# Patient Record
Sex: FEMALE | Race: WHITE | NOT HISPANIC OR LATINO | Employment: FULL TIME | URBAN - METROPOLITAN AREA
[De-identification: names, ages, dates, MRNs, and addresses within clinical notes are randomized per-mention and may not be internally consistent; named-entity substitution may affect disease eponyms.]

---

## 2023-05-17 ENCOUNTER — TELEPHONE (OUTPATIENT)
Dept: PRIMARY CARE | Facility: CLINIC | Age: 53
End: 2023-05-17
Payer: COMMERCIAL

## 2023-05-17 DIAGNOSIS — I10 HYPERTENSION, UNSPECIFIED TYPE: ICD-10-CM

## 2023-05-17 NOTE — TELEPHONE ENCOUNTER
Refill:    Candesartan 8mg 1 tab daily    Pharm: [facility]     LR: 02/22/23 qty 30 no refills  LV: 10/10/22   NV: 06/23/23

## 2023-05-18 PROBLEM — M25.551 HIP PAIN, RIGHT: Status: ACTIVE | Noted: 2023-05-18

## 2023-05-18 PROBLEM — M77.11 LATERAL EPICONDYLITIS OF RIGHT ELBOW: Status: ACTIVE | Noted: 2023-05-18

## 2023-05-18 PROBLEM — G56.02 LEFT CARPAL TUNNEL SYNDROME: Status: ACTIVE | Noted: 2023-05-18

## 2023-05-18 PROBLEM — D50.9 IRON DEFICIENCY ANEMIA: Status: ACTIVE | Noted: 2023-05-18

## 2023-05-18 PROBLEM — R25.2 MUSCLE CRAMPS: Status: ACTIVE | Noted: 2023-05-18

## 2023-05-18 PROBLEM — K63.5 COLON POLYPS: Status: ACTIVE | Noted: 2023-05-18

## 2023-05-18 PROBLEM — N64.89 PSEUDOANGIOMATOUS STROMAL HYPERPLASIA OF BREAST: Status: ACTIVE | Noted: 2023-05-18

## 2023-05-18 PROBLEM — E55.9 VITAMIN D INSUFFICIENCY: Status: ACTIVE | Noted: 2023-05-18

## 2023-05-18 PROBLEM — R93.89 ABNORMAL MRI: Status: ACTIVE | Noted: 2023-05-18

## 2023-05-18 PROBLEM — Z85.3 PERSONAL HISTORY OF MALIGNANT NEOPLASM OF BREAST: Status: ACTIVE | Noted: 2023-05-18

## 2023-05-18 PROBLEM — R23.2 HOT FLASHES: Status: ACTIVE | Noted: 2023-05-18

## 2023-05-18 PROBLEM — I10 HYPERTENSION: Status: ACTIVE | Noted: 2023-05-18

## 2023-05-18 PROBLEM — M85.80 OSTEOPENIA: Status: ACTIVE | Noted: 2023-05-18

## 2023-05-18 PROBLEM — J30.9 ALLERGIC RHINITIS: Status: ACTIVE | Noted: 2023-05-18

## 2023-05-18 PROBLEM — R92.30 DENSE BREAST TISSUE: Status: ACTIVE | Noted: 2023-05-18

## 2023-05-18 RX ORDER — CANDESARTAN 8 MG/1
8 TABLET ORAL DAILY
Qty: 90 TABLET | Refills: 0 | Status: SHIPPED | OUTPATIENT
Start: 2023-05-18 | End: 2023-06-23 | Stop reason: SDUPTHER

## 2023-05-18 RX ORDER — CANDESARTAN 8 MG/1
8 TABLET ORAL DAILY
COMMUNITY
End: 2023-05-18 | Stop reason: SDUPTHER

## 2023-06-21 RX ORDER — TAMOXIFEN CITRATE 20 MG/1
20 TABLET ORAL DAILY
COMMUNITY
End: 2023-11-14 | Stop reason: HOSPADM

## 2023-06-21 RX ORDER — IBUPROFEN 200 MG
2 CAPSULE ORAL DAILY
COMMUNITY
Start: 2020-07-06

## 2023-06-22 ASSESSMENT — ENCOUNTER SYMPTOMS
PALPITATIONS: 0
PND: 0
HEADACHES: 0
NECK PAIN: 0
BLURRED VISION: 0
SWEATS: 0
HYPERTENSION: 1
ORTHOPNEA: 0
SHORTNESS OF BREATH: 0

## 2023-06-23 ENCOUNTER — OFFICE VISIT (OUTPATIENT)
Dept: PRIMARY CARE | Facility: CLINIC | Age: 53
End: 2023-06-23
Payer: COMMERCIAL

## 2023-06-23 VITALS
SYSTOLIC BLOOD PRESSURE: 126 MMHG | DIASTOLIC BLOOD PRESSURE: 72 MMHG | TEMPERATURE: 98.2 F | WEIGHT: 109 LBS | BODY MASS INDEX: 19.32 KG/M2

## 2023-06-23 DIAGNOSIS — R23.2 HOT FLASHES: Primary | ICD-10-CM

## 2023-06-23 DIAGNOSIS — I10 HYPERTENSION, UNSPECIFIED TYPE: ICD-10-CM

## 2023-06-23 LAB
POC FINGERSTICK BLOOD GLUCOSE: 90 MG/DL (ref 70–100)
POC HDL CHOLESTEROL: 67 MG/DL (ref 0–50)
POC LDL CHOLESTEROL: 43 MG/DL (ref 0–100)
POC NON-HDL CHOLESTEROL: 53 MG/DL (ref 0–130)
POC TOTAL CHOLESTEROL/HDL RATIO: 1.8 (ref 0–4.5)
POC TOTAL CHOLESTEROL: 120 MG/DL (ref 0–199)
POC TRIGLYCERIDES: 52 MG/DL (ref 0–150)

## 2023-06-23 PROCEDURE — 99214 OFFICE O/P EST MOD 30 MIN: CPT | Performed by: FAMILY MEDICINE

## 2023-06-23 PROCEDURE — 82962 GLUCOSE BLOOD TEST: CPT | Performed by: FAMILY MEDICINE

## 2023-06-23 PROCEDURE — 3074F SYST BP LT 130 MM HG: CPT | Performed by: FAMILY MEDICINE

## 2023-06-23 PROCEDURE — 1036F TOBACCO NON-USER: CPT | Performed by: FAMILY MEDICINE

## 2023-06-23 PROCEDURE — 3078F DIAST BP <80 MM HG: CPT | Performed by: FAMILY MEDICINE

## 2023-06-23 PROCEDURE — 80061 LIPID PANEL: CPT | Performed by: FAMILY MEDICINE

## 2023-06-23 RX ORDER — FEZOLINETANT 45 MG/1
45 TABLET, FILM COATED ORAL DAILY
Qty: 90 TABLET | Refills: 1 | Status: SHIPPED | OUTPATIENT
Start: 2023-06-23 | End: 2023-10-10 | Stop reason: ALTCHOICE

## 2023-06-23 RX ORDER — CANDESARTAN 8 MG/1
8 TABLET ORAL DAILY
Qty: 90 TABLET | Refills: 1 | Status: SHIPPED | OUTPATIENT
Start: 2023-06-23 | End: 2024-02-12 | Stop reason: SDUPTHER

## 2023-06-23 NOTE — PATIENT INSTRUCTIONS
You are doing a great job with your health.    Follow up in one month to be reevaluated on the Veozah for hot flashes.

## 2023-06-23 NOTE — PROGRESS NOTES
Subjective   Patient ID: [id number]     Cristine Mac is a 52 y.o. female who presents for Med Management.    HPI  Still bothered by frequent daily and multiple nocturnal temperature flashes.   only mildly improved with SleepEase.    Review of Systems  GEN-no unexplained fever or chills  OPTH-No dry eyes, itchy eyes, or blurry vision   ENT-No hearing loss, tinnitus or vertigo  NECK-no stiffness, swelling or pain  PSYCH-No complaints regarding libido, appetite, memory or concentration;  no drug use or alcohol usage over six per week  SLEEP-No complaints of insomnia, apnea or restless legs  ALL/IMMUN-No history of sneezing or itching  HEM-No unexplained bruising or bleeding    Objective     /72 (BP Location: Right arm, Patient Position: Sitting)   Temp 36.8 °C (98.2 °F) (Oral)   Wt 49.4 kg (109 lb)   BMI 19.32 kg/m²      Physical Exam  Eyes-pupils equal round, reactive to light and accommodation, fundi with normal cup/disc ratio, conjunctiva without redness or discharge  General- well defined, well nourished, well hydrated individual in NAD  Skin- normal color and turgor; without nail pitting  Head-normocephalic without masses or tenderness  Ears-normal pinnae, and canals, with normal landmarks and light reflex of tympanic membranes bilaterally  Nose-septum in the midline, normal mucosa bilaterally  Throat- without erythema or exudate, uvula in midlineNeck-supple without lymphadenopathy or thyromegaly; no carotid bruits  Heart- regular rate and rhythm, normal s1 and s2 without murmur or gallop  Lungs-clear to auscultation  Abdomen-soft, positive bowel sounds, without masses, HSmegaly or pain   Assessment/Plan     Problem List Items Addressed This Visit       Hot flashes - Primary    Relevant Medications    fezolinetant (Veozah) 45 mg tablet    Hypertension    Relevant Medications    candesartan (Atacand) 8 mg tablet    Other Relevant Orders    POCT fingerstick glucose manually resulted    POCT Lipid Panel  manually resulted     You are doing a great job with your health.    Follow up in one month to be reevaluated on the Veozah for hot flashes.    Raman Rivas MD

## 2023-07-06 ENCOUNTER — TELEPHONE (OUTPATIENT)
Dept: PRIMARY CARE | Facility: CLINIC | Age: 53
End: 2023-07-06
Payer: COMMERCIAL

## 2023-07-06 NOTE — TELEPHONE ENCOUNTER
Patient calling stating that she didn't fill the fezolinetant (Veozah) 45 mg tablet. It cost to much at the pharmacy to get it. Patient wanted you to beware of this.  Thank you.

## 2023-07-24 ENCOUNTER — APPOINTMENT (OUTPATIENT)
Dept: PRIMARY CARE | Facility: CLINIC | Age: 53
End: 2023-07-24
Payer: COMMERCIAL

## 2023-10-09 ASSESSMENT — ENCOUNTER SYMPTOMS
VOMITING: 0
SORE THROAT: 0
FLANK PAIN: 0
ANOREXIA: 0
CHILLS: 0
HEMATURIA: 0
DYSURIA: 0
HEADACHES: 0
NAUSEA: 0
BACK PAIN: 0
ABDOMINAL PAIN: 0
CONSTIPATION: 0
DIARRHEA: 0
FREQUENCY: 0
FEVER: 0

## 2023-10-10 ENCOUNTER — OFFICE VISIT (OUTPATIENT)
Dept: PRIMARY CARE | Facility: CLINIC | Age: 53
End: 2023-10-10
Payer: COMMERCIAL

## 2023-10-10 VITALS — SYSTOLIC BLOOD PRESSURE: 116 MMHG | DIASTOLIC BLOOD PRESSURE: 70 MMHG | TEMPERATURE: 98.1 F

## 2023-10-10 DIAGNOSIS — H81.13 BENIGN PAROXYSMAL POSITIONAL VERTIGO DUE TO BILATERAL VESTIBULAR DISORDER: Primary | ICD-10-CM

## 2023-10-10 DIAGNOSIS — R42 VERTIGO: ICD-10-CM

## 2023-10-10 DIAGNOSIS — C50.412 MALIGNANT NEOPLASM OF UPPER-OUTER QUADRANT OF LEFT FEMALE BREAST, UNSPECIFIED ESTROGEN RECEPTOR STATUS (MULTI): ICD-10-CM

## 2023-10-10 PROCEDURE — 3078F DIAST BP <80 MM HG: CPT | Performed by: FAMILY MEDICINE

## 2023-10-10 PROCEDURE — 1036F TOBACCO NON-USER: CPT | Performed by: FAMILY MEDICINE

## 2023-10-10 PROCEDURE — 99213 OFFICE O/P EST LOW 20 MIN: CPT | Performed by: FAMILY MEDICINE

## 2023-10-10 PROCEDURE — 3074F SYST BP LT 130 MM HG: CPT | Performed by: FAMILY MEDICINE

## 2023-10-10 RX ORDER — MECLIZINE HYDROCHLORIDE 25 MG/1
25 TABLET ORAL 3 TIMES DAILY PRN
Qty: 30 TABLET | Refills: 11 | Status: SHIPPED | OUTPATIENT
Start: 2023-10-10 | End: 2024-10-09

## 2023-10-10 NOTE — PATIENT INSTRUCTIONS
Your Benign Positional Vertigo can be alleviated by reproducing the movement that triggered the vertigo and keeping your eyes fixed on a point on the horizon or inside, choose a wall switch.  Then you need to repeat the maneuver five times, each time waiting until the issue resolves.  Call if your symptoms persist over one week.

## 2023-10-10 NOTE — PROGRESS NOTES
Subjective   Patient ID: [id number]     Cristine Mac is a 53 y.o. female who presents for Vertigo.    HPI  Cristine woke up today without her glasses and when she rolled over , she had vertigo;  symptoms improved until h she got her hair done  Review of Systems  General-denies weakness or myalgias; no unexplained fever or chills  Opth-needs glasses to see over 12 inches  ENT-No hearing loss, tinnitus or sneezing  Neck-no unexplained swelling in neck or pain  ID-no fever  NEURO- No headaches, hx of concussion, falls in the last year or seizure       Objective     /70 (BP Location: Left arm, Patient Position: Sitting)   Temp 36.7 °C (98.1 °F) (Oral)      Physical Exam  General- well defined, well nourished, well hydrated individual in NAD  Eyes-pupils equal round, reactive to light and accommodation, fundi with normal cup/disc ratio, conjunctiva without redness or dischargeHead-normocephalic without masses or tenderness; extinguishable nystagmus noted with supine rotation  Ears-normal pinnae, and canals, with normal landmarks and light reflex of tympanic membranes bilaterally  Nose-septum in the midline, normal mucosa bilaterally  Throat- without erythema or exudate, uvula in midline  Neck-supple without lymphadenopathy or thyromegaly; no carotid bruits  Neuro- alert and & oriented times three;  CN's II through XII grossly intact; DTR's 2+ and symmetrical;  Negative Romberg;  muscle strength is 5/5 bilateral upper and lower extremities      Assessment/Plan     Problem List Items Addressed This Visit       Malignant neoplasm of upper-outer quadrant of left female breast, unspecified estrogen receptor status (CMS/HCC)    Vertigo - Primary    Benign paroxysmal positional vertigo due to bilateral vestibular disorder     Your Benign Positional Vertigo can be alleviated by reproducing the movement that triggered the vertigo and keeping your eyes fixed on a point on the horizon or inside, choose a wall switch.  Then you  need to repeat the maneuver five times, each time waiting until the issue resolves.  Call if your symptoms persist over one week.    Raman Rivas MD

## 2023-10-12 ENCOUNTER — OFFICE VISIT (OUTPATIENT)
Dept: OBSTETRICS AND GYNECOLOGY | Facility: CLINIC | Age: 53
End: 2023-10-12
Payer: COMMERCIAL

## 2023-10-12 VITALS
SYSTOLIC BLOOD PRESSURE: 112 MMHG | DIASTOLIC BLOOD PRESSURE: 80 MMHG | BODY MASS INDEX: 19.78 KG/M2 | WEIGHT: 107.5 LBS | HEIGHT: 62 IN

## 2023-10-12 DIAGNOSIS — N95.0 PMB (POSTMENOPAUSAL BLEEDING): Primary | ICD-10-CM

## 2023-10-12 DIAGNOSIS — Z79.810 USE OF TAMOXIFEN (NOLVADEX): ICD-10-CM

## 2023-10-12 LAB — PREGNANCY TEST URINE, POC: NEGATIVE

## 2023-10-12 PROCEDURE — 3079F DIAST BP 80-89 MM HG: CPT | Performed by: STUDENT IN AN ORGANIZED HEALTH CARE EDUCATION/TRAINING PROGRAM

## 2023-10-12 PROCEDURE — 58100 BIOPSY OF UTERUS LINING: CPT | Performed by: STUDENT IN AN ORGANIZED HEALTH CARE EDUCATION/TRAINING PROGRAM

## 2023-10-12 PROCEDURE — 88305 TISSUE EXAM BY PATHOLOGIST: CPT

## 2023-10-12 PROCEDURE — 1036F TOBACCO NON-USER: CPT | Performed by: STUDENT IN AN ORGANIZED HEALTH CARE EDUCATION/TRAINING PROGRAM

## 2023-10-12 PROCEDURE — 81025 URINE PREGNANCY TEST: CPT | Performed by: STUDENT IN AN ORGANIZED HEALTH CARE EDUCATION/TRAINING PROGRAM

## 2023-10-12 PROCEDURE — 3074F SYST BP LT 130 MM HG: CPT | Performed by: STUDENT IN AN ORGANIZED HEALTH CARE EDUCATION/TRAINING PROGRAM

## 2023-10-12 PROCEDURE — 99213 OFFICE O/P EST LOW 20 MIN: CPT | Performed by: STUDENT IN AN ORGANIZED HEALTH CARE EDUCATION/TRAINING PROGRAM

## 2023-10-12 NOTE — PROGRESS NOTES
Subjective   Cristine Mac is a 53 y.o. presenting for PMB. Not sure when she officially went through menopause but states that is has been >1 year since her last period. Reports rare spotting last year and now with daily spotting and dark discharge requiring panty liner use. Sexually active with  who has had a vasectomy. Some vaginal dryness, no dyspareunia or postcoital bleeding. Denies h/o STI or concern for STI today. Hot flashes previously managed with venlafaxine and gabapentin, no longer taking. Is able to sleep through the night. On tamoxifen for ER/OK+ breast cancer. Has previously had endometrial cells on pap smears with two negative EMBs, most recently in . Of note, recently diagnosed with vertigo, symptoms improving but still present.    Pap History  -2019: NIL/HRHPV neg  -2016: NIL, +endometrial cells -> EMB proliferative  -2015: NIL, +endometrial cells -> EMB disordered proliferative endometrium with tubal metaplasia    OBHx:  x2  PMHx: ER/OK positive invasive lobular carcinoma of left breast s/p lumpectomy in 2018 now on tamoxifen, HTN, osteopenia, basal cell carcinoma  SurgHx: denies uterine procedures  Meds/Allergies: updated  FHx: daughter  of kidney cancer at age 12, maternal grandmother with ovarian cancer in her 50s, mother with breast cancer in her 70s, patient s/p negative genetic testing    Objective   Physical Exam  Vitals:    10/12/23 0836   BP: 112/80      Gen: awake, alert  Head: NCAT  HEENT: moist mucus membranes  Pulm: breathing comfortably on room air  CV: warm and well-perfused  : anteverted uterus, no palpable mass. Evidence of vaginal atrophy on exam. No blood in vault. Cervix normal in appearance and required dilation. Sounded to 7cm. See separate procedure note for more detail  Neuro: alert and oriented  Psych: appropriate affect     Assessment/Plan     Cristine Mac is a 53 y.o. postmenopausal woman who presents today for PMB on tamoxifen.      Postmenopausal Bleeding  -Reviewed possible etiologies including atrophy, polyps, endometrial hyperplasia, and endometrial carcinoma. Additionally reviewed possible non-uterine sources of bleeding including vulva, vagina, and cervix  -Exam today with small anteverted uterus and evidence of vaginal atrophy, no active bleeding  -Discussed r/b/a to EMB vs TVUS in initial workup. Given current tamoxifen use, recommend EMB. Performed today, adequate sample, tolerated well  -Pap UTD, low risk for STI and declined testing today  -If EMB negative, did discuss possible diagnostic hysteroscopy for further evaluation if bleeding persists     Will call with EMB results     Anai Shea MD

## 2023-10-12 NOTE — PROGRESS NOTES
Endometrial biopsy    Date/Time: 10/12/2023 9:08 AM    Performed by: Anai Shea MD  Authorized by: Anai Shea MD    Consent:     Consent obtained: written    Consent given by: patient    Risks discussed: bleeding, damage to other organs, infection, need for repeat procedure and pain    Patient agrees, verbalizes understanding, and wants to proceed: yes      Procedure explained and questions answered to patient or proxy's satisfaction: yes    Indications:     Indications: postmenopausal bleeding    Pre-procedure:     Urine pregnancy test: negative    Procedure:     A bimanual exam was performed: yes      Uterus size: 6-8 weeks    Uterus position: anteverted    Prepped with: Betadine    Tenaculum used: yes      A local block was performed: no      Cervix dilated: yes      Number of passes: 1  Findings:     Cervix: stenotic      Uterus depth by sound (cm): 7    Specimen collected: specimen collected and sent to pathology      Patient tolerance: tolerated well, no immediate complications     Anai Shea MD

## 2023-10-23 LAB
LABORATORY COMMENT REPORT: NORMAL
PATH REPORT.FINAL DX SPEC: NORMAL
PATH REPORT.GROSS SPEC: NORMAL
PATH REPORT.RELEVANT HX SPEC: NORMAL
PATH REPORT.TOTAL CANCER: NORMAL

## 2023-10-26 ENCOUNTER — TELEPHONE (OUTPATIENT)
Dept: OBSTETRICS AND GYNECOLOGY | Facility: CLINIC | Age: 53
End: 2023-10-26
Payer: COMMERCIAL

## 2023-10-26 ENCOUNTER — HOSPITAL ENCOUNTER (OUTPATIENT)
Dept: RADIOLOGY | Facility: HOSPITAL | Age: 53
Discharge: HOME | End: 2023-10-26
Payer: COMMERCIAL

## 2023-10-26 DIAGNOSIS — N95.0 PMB (POSTMENOPAUSAL BLEEDING): ICD-10-CM

## 2023-10-26 PROCEDURE — 76830 TRANSVAGINAL US NON-OB: CPT | Performed by: RADIOLOGY

## 2023-10-26 PROCEDURE — 76856 US EXAM PELVIC COMPLETE: CPT

## 2023-10-26 PROCEDURE — 76857 US EXAM PELVIC LIMITED: CPT | Performed by: RADIOLOGY

## 2023-10-26 NOTE — TELEPHONE ENCOUNTER
Patient notified through [employer] to schedule ultrasound. Advised to call our office after she has this done to schedule an appointment to discuss results.

## 2023-11-02 ENCOUNTER — TELEPHONE (OUTPATIENT)
Dept: HEMATOLOGY/ONCOLOGY | Facility: CLINIC | Age: 53
End: 2023-11-02

## 2023-11-02 ENCOUNTER — PREP FOR PROCEDURE (OUTPATIENT)
Dept: OBSTETRICS AND GYNECOLOGY | Facility: CLINIC | Age: 53
End: 2023-11-02

## 2023-11-02 ENCOUNTER — TELEMEDICINE (OUTPATIENT)
Dept: OBSTETRICS AND GYNECOLOGY | Facility: CLINIC | Age: 53
End: 2023-11-02
Payer: COMMERCIAL

## 2023-11-02 DIAGNOSIS — N95.0 POSTMENOPAUSAL BLEEDING: Primary | ICD-10-CM

## 2023-11-02 PROCEDURE — 99213 OFFICE O/P EST LOW 20 MIN: CPT | Performed by: STUDENT IN AN ORGANIZED HEALTH CARE EDUCATION/TRAINING PROGRAM

## 2023-11-02 NOTE — PROGRESS NOTES
Subjective   Patient ID [id number]   Cristine Mac is a 53 y.o. with PMB presenting for virtual follow-up. Patient with ER/CA positive breast cancer s/p lumpectomy in 2018 now on tamoxifen and having daily spotting. EMB with scant superficial inactive endometrium as well as fragment of necrotic/hyalinized endometrium, possible infarcted polyp. TVUS with 18mm heterogenous, hypervascular endometrium with cystic changes. Since last visit, patient reports continued daily spotting. She is now seeing ALONSO Avril Rivera as Dr. Dodson is retiring.    Assessment/Plan     Cristine Mac is a 53 y.o. with PMB on tamoxifen presenting for virtual follow-up    PMB  -EMB with scant superficial inactive endometrium as well as fragment of necrotic/hyalinized endometrium, possible infarcted polyp  -TVUS with EMS measuring 18mm, hypervascular with cystic changes  -Discussed concern for endometrial hyperplasia/malignancy based on ultrasound findings and recommendation for diagnostic hysteroscopy with possible polypectomy and directed endometrial sampling. Reviewed risks of surgery including but not limited to bleeding, infection, uterine perforation, and damage to surrounding structures. Patient expressed understanding and desires to proceed. Will plan for PAT and same day discharge.   -ALONSO Miguel updated on patient status and recommends holding tamoxifen until tissue diagnosis is obtained     To schedule surgery    Anai Shea MD

## 2023-11-02 NOTE — TELEPHONE ENCOUNTER
Received this email:     Chico Serna,     I am a gynecologist taking care of Cristine Mac (MRN [id number]). She is a 52 y/o patient of Dr. Dodson with ER/WI positive breast cancer s/p lumpectomy in 2018 who has been on tamoxifen for the past 6 years and now has postmenopausal bleeding with imaging concerning for endometrial hyperplasia/malignancy. I am planning to take her to the OR this month for a hysteroscopy with biopsy to obtain a tissue diagnosis and just wanted to loop you in. I am not sure if you have seen her as a patient yet but she said she was transitioning to care with you now that Dr. Dodson is retiring. Will keep you posted on the outcome.    Thanks,    Anai Shea MD    [facility]  [address] [place] [address], [place] [address]  [phone]        Called patient and left her a VM to call me to confirm she received my message to HOLD tamoxifen while above situation is being worked up/evaluated. Left my office #. Updated Dr. Shea.

## 2023-11-03 ENCOUNTER — TELEPHONE (OUTPATIENT)
Dept: HEMATOLOGY/ONCOLOGY | Facility: HOSPITAL | Age: 53
End: 2023-11-03
Payer: COMMERCIAL

## 2023-11-03 NOTE — TELEPHONE ENCOUNTER
Patient called me back and agreed to hold tamoxifen until work up is done by gyn and we know biopsy results.

## 2023-11-09 ENCOUNTER — PRE-ADMISSION TESTING (OUTPATIENT)
Dept: PREADMISSION TESTING | Facility: HOSPITAL | Age: 53
End: 2023-11-09
Payer: COMMERCIAL

## 2023-11-09 ENCOUNTER — LAB (OUTPATIENT)
Dept: LAB | Facility: LAB | Age: 53
End: 2023-11-09
Payer: COMMERCIAL

## 2023-11-09 VITALS
RESPIRATION RATE: 16 BRPM | WEIGHT: 106.7 LBS | OXYGEN SATURATION: 100 % | DIASTOLIC BLOOD PRESSURE: 80 MMHG | SYSTOLIC BLOOD PRESSURE: 129 MMHG | TEMPERATURE: 98.8 F | HEART RATE: 62 BPM | BODY MASS INDEX: 19.64 KG/M2 | HEIGHT: 62 IN

## 2023-11-09 DIAGNOSIS — N95.0 POSTMENOPAUSAL BLEEDING: ICD-10-CM

## 2023-11-09 DIAGNOSIS — Z01.818 PREOP EXAMINATION: ICD-10-CM

## 2023-11-09 DIAGNOSIS — Z01.818 PREOP EXAMINATION: Primary | ICD-10-CM

## 2023-11-09 DIAGNOSIS — N93.8 DUB (DYSFUNCTIONAL UTERINE BLEEDING): ICD-10-CM

## 2023-11-09 LAB
ABO GROUP (TYPE) IN BLOOD: NORMAL
ANION GAP SERPL CALC-SCNC: 13 MMOL/L (ref 10–20)
ANTIBODY SCREEN: NORMAL
BUN SERPL-MCNC: 11 MG/DL (ref 6–23)
CALCIUM SERPL-MCNC: 9.4 MG/DL (ref 8.6–10.3)
CHLORIDE SERPL-SCNC: 105 MMOL/L (ref 98–107)
CO2 SERPL-SCNC: 27 MMOL/L (ref 21–32)
CREAT SERPL-MCNC: 0.88 MG/DL (ref 0.5–1.05)
ERYTHROCYTE [DISTWIDTH] IN BLOOD BY AUTOMATED COUNT: 12.4 % (ref 11.5–14.5)
GFR SERPL CREATININE-BSD FRML MDRD: 78 ML/MIN/1.73M*2
GLUCOSE SERPL-MCNC: 75 MG/DL (ref 74–99)
HCT VFR BLD AUTO: 34.7 % (ref 36–46)
HGB BLD-MCNC: 11.6 G/DL (ref 12–16)
MCH RBC QN AUTO: 29.9 PG (ref 26–34)
MCHC RBC AUTO-ENTMCNC: 33.4 G/DL (ref 32–36)
MCV RBC AUTO: 89 FL (ref 80–100)
NRBC BLD-RTO: 0 /100 WBCS (ref 0–0)
PLATELET # BLD AUTO: 270 X10*3/UL (ref 150–450)
POTASSIUM SERPL-SCNC: 3.9 MMOL/L (ref 3.5–5.3)
RBC # BLD AUTO: 3.88 X10*6/UL (ref 4–5.2)
RH FACTOR (ANTIGEN D): NORMAL
SODIUM SERPL-SCNC: 141 MMOL/L (ref 136–145)
WBC # BLD AUTO: 3.5 X10*3/UL (ref 4.4–11.3)

## 2023-11-09 PROCEDURE — 99202 OFFICE O/P NEW SF 15 MIN: CPT | Performed by: NURSE PRACTITIONER

## 2023-11-09 PROCEDURE — 86900 BLOOD TYPING SEROLOGIC ABO: CPT

## 2023-11-09 PROCEDURE — 85027 COMPLETE CBC AUTOMATED: CPT

## 2023-11-09 PROCEDURE — 93010 ELECTROCARDIOGRAM REPORT: CPT | Performed by: INTERNAL MEDICINE

## 2023-11-09 PROCEDURE — 86850 RBC ANTIBODY SCREEN: CPT

## 2023-11-09 PROCEDURE — 80048 BASIC METABOLIC PNL TOTAL CA: CPT

## 2023-11-09 PROCEDURE — 36415 COLL VENOUS BLD VENIPUNCTURE: CPT

## 2023-11-09 PROCEDURE — 93005 ELECTROCARDIOGRAM TRACING: CPT

## 2023-11-09 PROCEDURE — 86901 BLOOD TYPING SEROLOGIC RH(D): CPT

## 2023-11-09 ASSESSMENT — CHADS2 SCORE
CHF: NO
DIABETES: NO
AGE GREATER THAN OR EQUAL TO 75: NO
CHADS2 SCORE: 1
PRIOR STROKE OR TIA OR THROMBOEMBOLISM: NO
HYPERTENSION: YES

## 2023-11-09 ASSESSMENT — DUKE ACTIVITY SCORE INDEX (DASI)
DASI METS SCORE: 9.9
CAN YOU WALK INDOORS, SUCH AS AROUND YOUR HOUSE: YES
TOTAL_SCORE: 58.2
CAN YOU WALK A BLOCK OR TWO ON LEVEL GROUND: YES
CAN YOU DO HEAVY WORK AROUND THE HOUSE LIKE SCRUBBING FLOORS OR LIFTING AND MOVING HEAVY FURNITURE: YES
CAN YOU DO LIGHT WORK AROUND THE HOUSE LIKE DUSTING OR WASHING DISHES: YES
CAN YOU RUN A SHORT DISTANCE: YES
CAN YOU DO YARD WORK LIKE RAKING LEAVES, WEEDING OR PUSHING A MOWER: YES
CAN YOU PARTICIPATE IN STRENOUS SPORTS LIKE SWIMMING, SINGLES TENNIS, FOOTBALL, BASKETBALL, OR SKIING: YES
CAN YOU HAVE SEXUAL RELATIONS: YES
CAN YOU CLIMB A FLIGHT OF STAIRS OR WALK UP A HILL: YES
CAN YOU TAKE CARE OF YOURSELF (EAT, DRESS, BATHE, OR USE TOILET): YES
CAN YOU DO MODERATE WORK AROUND THE HOUSE LIKE VACUUMING, SWEEPING FLOORS OR CARRYING GROCERIES: YES
CAN YOU PARTICIPATE IN MODERATE RECREATIONAL ACTIVITIES LIKE GOLF, BOWLING, DANCING, DOUBLES TENNIS OR THROWING A BASEBALL OR FOOTBALL: YES

## 2023-11-09 ASSESSMENT — LIFESTYLE VARIABLES: SMOKING_STATUS: NONSMOKER

## 2023-11-09 ASSESSMENT — PAIN - FUNCTIONAL ASSESSMENT: PAIN_FUNCTIONAL_ASSESSMENT: 0-10

## 2023-11-09 ASSESSMENT — ACTIVITIES OF DAILY LIVING (ADL): ADL_SCORE: 0

## 2023-11-09 NOTE — PREPROCEDURE INSTRUCTIONS
Medication List            Accurate as of November 9, 2023  9:16 AM. Always use your most recent med list.                calcium carbonate-vitamin D3 500 mg-3.125 mcg (125 unit) tablet tablet  Medication Adjustments for Surgery: Stop 7 days before surgery     candesartan 8 mg tablet  Commonly known as: Atacand  Take 1 tablet (8 mg) by mouth once daily.  Medication Adjustments for Surgery: Take morning of surgery with sip of water, no other fluids     meclizine 25 mg tablet  Commonly known as: Antivert  Take 1 tablet (25 mg) by mouth 3 times a day as needed for dizziness.  Medication Adjustments for Surgery: Other (Comment)  Notes to patient: Prn- hold day of procedure     tamoxifen 20 mg tablet  Commonly known as: Nolvadex  Medication Adjustments for Surgery: Other (Comment)  Notes to patient: Not taking                        PRE-OPERATIVE INSTRUCTIONS    You will receive notification one business day prior to your surgery to confirm your arrival time and additional information. It is important that you answer your phone and/or check your messages during this time.    Please enter the building through the Outpatient entrance. Take the elevator off the lobby to the 2nd floor and check in at the Outpatient Surgery desk    INSTRUCTIONS:  Talk to your surgeon for instructions if you should stop your aspirin, blood thinner, or diabetes medicines.  DO NOT take any multivitamins or over the counter supplements for 7-10 days before surgery.  If not being admitted, you must have an adult immediately available to drive you home after surgery. We also highly recommend you have someone stay with you for the entire day and night of your surgery.  For children having surgery, a parent or legal guardian must accompany t hem to the surgery center. If this is not possible, please call [phone] to make additional arrangements.  For adults who are unable to consent or make medical decisions for themselves, a legal guardian or  Power of  must accompany them to the surgery center. If this is not possible, please call [phone] to make additional arrangements.  Wear comfortable, loose fitting clothing.  All jewelry and piercings must be removed. If you are unable to remove an item or have a dermal piercing, please be sure to tell the nurse when you arrive for surgery.  Nail polish and make-up must be removed.  Avoid smoking or consuming alcohol for 24 hours before surgery.  To help prevent infection, please take a shower/bath and wash your hair the night before and/or morning of surgery.    Additional instructions about eating and drinking before surgery:  Do not eat any solid foods or drink anything but clear liquids within 6 hours of your arrival time for surgery. Milk, nutritional drinks/supplements, and infant formula are considered solid foods.  You may drink up to 12 oz. of clear liquids up to 2 hours before your arrival time for surgery, unless directed otherwise by your surgeon. Clear liquids include water, non-carbonated sports drinks (Gatorade), black tea or coffee (no creamers) and breast milk.    If you received a blue folder, please review additional information provided inside the folder regarding additional preparation.     If you have any questions or concerns, please call Pre-Admission Testing at [phone].          Preoperative Bathing instructions    Follow these instructions the evening before and morning of surgery:  Do not shave the day before or day of surgery.  Remove all jewelry until after surgery. Take off rings and take out all body-piercing jewelry.  Use a clean wash cloth and towel.  Wash your face and hair with your normal soap and shampoo before you use the CHG soap.  Use a wash cloth to clean your skin with the CHG soap. Use enough CHG soap to cover the skin on your whole body. Use the same amount as you would with your normal soap.  Do not use the CHG soap on your face, eyes, ears, or  head.  Do not scrub your skin too hard.  Be sure to clean the area well where surgery will be done.  Do not wash with your normal soap after the CHG soap.  Keep away from the water stream when you put CHG soap on. This keeps it from rinsing off too soon.  Rinse your body well.  Pat yourself dry with a clean, soft towel.  Put on clean clothing.  Do not put on any deodorants, lotions, or oils after showering. These might block how the CHG soap works.

## 2023-11-09 NOTE — CPM/PAT H&P
"CPM/PAT Evaluation       Name: Patsy JOSE Mac (Patsy JOSE Mac \"Cristine\")  /Age: 1970/53 y.o.     In-Person     HPI53 yo female here for preop evaluation for DUB for the past 2 months. Spotting but no clotting. Transvaginal US showed thickened endometrial lining. No abdominal pain or cramping.    Past Medical History:   Diagnosis Date    Achilles tendinitis, left leg 2017    Achilles tendinitis of left lower extremity    Anaphylactic shock, unspecified, initial encounter 2015    Exercise induced anaphylaxis    Anemia     Cancer (CMS/[facility])     Chalazion left eye, unspecified eyelid 2015    Acute chalazion of left eye    Cough, unspecified 2014    Cough    Decreased white blood cell count, unspecified 2018    Decreased white blood cell count    Essential (primary) hypertension 10/10/2022    Hypertension    Follicular cyst of the skin and subcutaneous tissue, unspecified 2019    Skin cyst    Hordeolum externum left eye, unspecified eyelid 2016    Hordeolum externum of left eye    Hordeolum externum left eye, unspecified eyelid 2015    Hordeolum externum of left eye    Hordeolum externum unspecified eye, unspecified eyelid 2015    Stye    Hordeolum externum unspecified eye, unspecified eyelid 2015    Stye    Hordeolum externum unspecified eye, unspecified eyelid 2019    Stye    Lesion of sciatic nerve, right lower limb 2021    Piriformis syndrome of right side    Melena 2015    Hematochezia    Other conditions influencing health status 2020    Colonoscopy planned    Other conditions influencing health status 2018    Seroma, postoperative    Other general symptoms and signs 10/18/2020    Flu-like symptoms    Other specified disorders of eye and adnexa 2015    Eye irritation    Pain in left ankle and joints of left foot     Left ankle pain    Pain in left wrist 2017    Left wrist pain    Pain in leg, unspecified " 01/28/2019    Lower extremity pain    Pain in right finger(s) 01/21/2019    Pain of right thumb    Pain in unspecified shoulder 01/28/2019    Shoulder pain    Peroneal tendinitis, left leg 07/13/2020    Peroneal tendinitis of left lower extremity    Personal history of other diseases of the digestive system 09/01/2015    History of acute gastritis    Personal history of other diseases of the musculoskeletal system and connective tissue 05/10/2019    History of sacroiliac joint dysfunction    Personal history of other diseases of the musculoskeletal system and connective tissue 05/10/2019    History of low back pain    Personal history of other diseases of the respiratory system 01/06/2014    History of sinusitis    Personal history of other diseases of the respiratory system 07/01/2014    History of pharyngitis    Personal history of other specified conditions 03/11/2015    History of atypical nevus    Sciatica, right side 05/10/2019    Sciatica of right side    Stress fracture, left foot, initial encounter for fracture 07/06/2020    Metatarsal stress fracture of left foot    Vertigo Oct 2023    resolved    Vitamin D deficiency        Past Surgical History:   Procedure Laterality Date    BREAST BIOPSY      BREAST LUMPECTOMY      BREAST SURGERY  Feb 2018    KNEE SURGERY  04/09/2015    Knee Surgery    LYMPH NODE BIOPSY  Feb 2018    NOSE SURGERY  04/09/2015    Nose Surgery    OTHER SURGICAL HISTORY  04/07/2015    Prior Surgical Procedure Not Done    OTHER SURGICAL HISTORY  04/09/2015    Wrist Primary Excision Of Ganglion    SINUS SURGERY      WISDOM TOOTH EXTRACTION         Patient  reports being sexually active and has had partner(s) who are male. She reports using the following method of birth control/protection: Male Sterilization.    Family History   Problem Relation Name Age of Onset    Breast cancer Mother Pat     Cancer Mother Pat     Hypertension Mother Pat     Macular degeneration Father Rich     Vision loss  Father Rich     Wilm's tumor Daughter Gaby     Cancer Daughter Gaby     Breast cancer Maternal Grandmother Risa     Cancer Maternal Grandmother Risa     Other (malignant Neoplasm of breast) Paternal Grandmother      Cancer Other Gaby     Intellectual Disability Brother Jamshid        No Known Allergies    Prior to Admission medications    Medication Sig Start Date End Date Taking? Authorizing Provider   calcium carbonate-vitamin D3 500 mg-3.125 mcg (125 unit) tablet tablet Take 2 tablets by mouth once daily. 7/6/20   Historical Provider, MD   candesartan (Atacand) 8 mg tablet Take 1 tablet (8 mg) by mouth once daily. 6/23/23 12/20/23  Raman Rivas MD   meclizine (Antivert) 25 mg tablet Take 1 tablet (25 mg) by mouth 3 times a day as needed for dizziness. 10/10/23 10/9/24  Raman Rivas MD   tamoxifen (Nolvadex) 20 mg tablet Take 1 tablet (20 mg total) by mouth once daily.    Historical Provider, MD      Constitutional: Negative for fever, chills, or sweats   ENMT: Negative for nasal discharge, congestion, ear pain, mouth pain, throat pain   Respiratory: Negative for cough, wheezing, shortness of breath   Cardiac: Negative for chest pain, dyspnea on exertion, palpitations   Gastrointestinal: Negative for nausea, vomiting, diarrhea, constipation, abdominal pain  Genitourinary: see hpi  Musculoskeletal: Negative for decreased ROM, pain, swelling, weakness   Neurological: Negative for dizziness, confusion, headache  Psychiatric: Negative for mood changes   Skin: Negative for itching, rash, ulcer    Hematologic/Lymph: Negative for bruising, easy bleeding  Allergic/Immunologic: Negative itching, sneezing, swelling     CONSTITUTIONAL - thin, well developed, looks like stated age  SKIN - normal skin color and pigmentation, no rash or lesions  HEAD - no trauma, normocephalic  EYES - contact lenses, pupils are equal and reactive to light, extraocular muscles are intact  ENT - uvula midline, normal tongue movement  and throat normal, no exudate, nasal passage without discharge and patent  NECK - supple without rigidity, full ROM  CHEST - clear to auscultation, no wheezing, no crackles and no rales, good effort  CARDIAC - regular rate and regular rhythm, no skipped beats, no murmur  ABDOMEN - no organomegaly, soft, nontender, nondistended, normal bowel sounds, no guarding/rebound/rigidity   EXTREMITIES - no edema, no deformities  NEUROLOGICAL - normal gait, normal balance, normal motor; alert, oriented and no focal signs  PSYCHIATRIC - alert, pleasant and cordial, age-appropriate  IMMUNOLOGIC - no cervical lymphadenopathy     PAT AIRWAY:   Airway:     Mallampati::  II  normal        Visit Vitals  /80   Pulse 62   Temp 37.1 °C (98.8 °F) (Temporal)   Resp 16     EKG: NSR HR 65 bpm    DASI Risk Score    No data to display       Caprini DVT Assessment    No data to display       Modified Frailty Index    No data to display       CHADS2 Stroke Risk  Current as of 13 minutes ago        N/A 3 - 100%: High Risk   2 - 3%: Medium Risk   0 - 2%: Low Risk     Last Change: N/A          This score determines the patient's risk of having a stroke if the patient has atrial fibrillation.        This score is not applicable to this patient. Components are not calculated.          Revised Cardiac Risk Index    No data to display       Apfel Simplified Score    No data to display       Risk Analysis Index Results This Encounter    No data found in the last 1 encounters.         Assessment and Plan:   52 yo female scheduled for hysteroscopy with excision tissue with Dr. Shea 11/14/2023. Labs ordered by surgeon. BMP/EKG ordered.    See risk scores as previously documented.

## 2023-11-11 LAB
ATRIAL RATE: 65 BPM
P AXIS: 71 DEGREES
P OFFSET: 206 MS
P ONSET: 150 MS
PR INTERVAL: 136 MS
Q ONSET: 218 MS
QRS COUNT: 11 BEATS
QRS DURATION: 98 MS
QT INTERVAL: 412 MS
QTC CALCULATION(BAZETT): 428 MS
QTC FREDERICIA: 422 MS
R AXIS: 82 DEGREES
T AXIS: 58 DEGREES
T OFFSET: 424 MS
VENTRICULAR RATE: 65 BPM

## 2023-11-13 ENCOUNTER — PREP FOR PROCEDURE (OUTPATIENT)
Dept: OBSTETRICS AND GYNECOLOGY | Facility: HOSPITAL | Age: 53
End: 2023-11-13
Payer: COMMERCIAL

## 2023-11-14 ENCOUNTER — HOSPITAL ENCOUNTER (OUTPATIENT)
Facility: HOSPITAL | Age: 53
Setting detail: OUTPATIENT SURGERY
Discharge: HOME | End: 2023-11-14
Attending: STUDENT IN AN ORGANIZED HEALTH CARE EDUCATION/TRAINING PROGRAM | Admitting: STUDENT IN AN ORGANIZED HEALTH CARE EDUCATION/TRAINING PROGRAM
Payer: COMMERCIAL

## 2023-11-14 ENCOUNTER — ANESTHESIA (OUTPATIENT)
Dept: OPERATING ROOM | Facility: HOSPITAL | Age: 53
End: 2023-11-14
Payer: COMMERCIAL

## 2023-11-14 ENCOUNTER — ANESTHESIA EVENT (OUTPATIENT)
Dept: OPERATING ROOM | Facility: HOSPITAL | Age: 53
End: 2023-11-14
Payer: COMMERCIAL

## 2023-11-14 VITALS
SYSTOLIC BLOOD PRESSURE: 136 MMHG | TEMPERATURE: 98.8 F | HEART RATE: 62 BPM | DIASTOLIC BLOOD PRESSURE: 79 MMHG | OXYGEN SATURATION: 100 % | RESPIRATION RATE: 16 BRPM

## 2023-11-14 DIAGNOSIS — G89.18 POSTOPERATIVE PAIN: ICD-10-CM

## 2023-11-14 DIAGNOSIS — N95.0 POSTMENOPAUSAL BLEEDING: Primary | ICD-10-CM

## 2023-11-14 DIAGNOSIS — R52 POSTPARTUM PAIN (HHS-HCC): ICD-10-CM

## 2023-11-14 PROCEDURE — 3600000003 HC OR TIME - INITIAL BASE CHARGE - PROCEDURE LEVEL THREE: Performed by: STUDENT IN AN ORGANIZED HEALTH CARE EDUCATION/TRAINING PROGRAM

## 2023-11-14 PROCEDURE — A58558 PR HYSTEROSCOPY,W/ENDO BX: Performed by: ANESTHESIOLOGIST ASSISTANT

## 2023-11-14 PROCEDURE — A58558 PR HYSTEROSCOPY,W/ENDO BX: Performed by: ANESTHESIOLOGY

## 2023-11-14 PROCEDURE — 58558 HYSTEROSCOPY BIOPSY: CPT | Performed by: STUDENT IN AN ORGANIZED HEALTH CARE EDUCATION/TRAINING PROGRAM

## 2023-11-14 PROCEDURE — 88305 TISSUE EXAM BY PATHOLOGIST: CPT | Mod: TC,SUR,STJLAB | Performed by: STUDENT IN AN ORGANIZED HEALTH CARE EDUCATION/TRAINING PROGRAM

## 2023-11-14 PROCEDURE — 7100000009 HC PHASE TWO TIME - INITIAL BASE CHARGE: Performed by: STUDENT IN AN ORGANIZED HEALTH CARE EDUCATION/TRAINING PROGRAM

## 2023-11-14 PROCEDURE — 3700000001 HC GENERAL ANESTHESIA TIME - INITIAL BASE CHARGE: Performed by: STUDENT IN AN ORGANIZED HEALTH CARE EDUCATION/TRAINING PROGRAM

## 2023-11-14 PROCEDURE — 7100000010 HC PHASE TWO TIME - EACH INCREMENTAL 1 MINUTE: Performed by: STUDENT IN AN ORGANIZED HEALTH CARE EDUCATION/TRAINING PROGRAM

## 2023-11-14 PROCEDURE — 3600000008 HC OR TIME - EACH INCREMENTAL 1 MINUTE - PROCEDURE LEVEL THREE: Performed by: STUDENT IN AN ORGANIZED HEALTH CARE EDUCATION/TRAINING PROGRAM

## 2023-11-14 PROCEDURE — 2500000004 HC RX 250 GENERAL PHARMACY W/ HCPCS (ALT 636 FOR OP/ED): Performed by: ANESTHESIOLOGIST ASSISTANT

## 2023-11-14 PROCEDURE — 3700000002 HC GENERAL ANESTHESIA TIME - EACH INCREMENTAL 1 MINUTE: Performed by: STUDENT IN AN ORGANIZED HEALTH CARE EDUCATION/TRAINING PROGRAM

## 2023-11-14 PROCEDURE — 88305 TISSUE EXAM BY PATHOLOGIST: CPT | Mod: TC,STJLAB | Performed by: STUDENT IN AN ORGANIZED HEALTH CARE EDUCATION/TRAINING PROGRAM

## 2023-11-14 PROCEDURE — 7100000002 HC RECOVERY ROOM TIME - EACH INCREMENTAL 1 MINUTE: Performed by: STUDENT IN AN ORGANIZED HEALTH CARE EDUCATION/TRAINING PROGRAM

## 2023-11-14 PROCEDURE — 2720000007 HC OR 272 NO HCPCS: Performed by: STUDENT IN AN ORGANIZED HEALTH CARE EDUCATION/TRAINING PROGRAM

## 2023-11-14 PROCEDURE — 7100000001 HC RECOVERY ROOM TIME - INITIAL BASE CHARGE: Performed by: STUDENT IN AN ORGANIZED HEALTH CARE EDUCATION/TRAINING PROGRAM

## 2023-11-14 PROCEDURE — 88305 TISSUE EXAM BY PATHOLOGIST: CPT | Performed by: PATHOLOGY

## 2023-11-14 PROCEDURE — 2500000005 HC RX 250 GENERAL PHARMACY W/O HCPCS: Performed by: ANESTHESIOLOGIST ASSISTANT

## 2023-11-14 RX ORDER — IBUPROFEN 600 MG/1
600 TABLET ORAL EVERY 6 HOURS PRN
Qty: 120 TABLET | Refills: 1 | Status: SHIPPED | OUTPATIENT
Start: 2023-11-14 | End: 2023-11-28 | Stop reason: ALTCHOICE

## 2023-11-14 RX ORDER — DIPHENHYDRAMINE HYDROCHLORIDE 50 MG/ML
12.5 INJECTION INTRAMUSCULAR; INTRAVENOUS ONCE AS NEEDED
Status: DISCONTINUED | OUTPATIENT
Start: 2023-11-14 | End: 2023-11-14 | Stop reason: HOSPADM

## 2023-11-14 RX ORDER — ACETAMINOPHEN 325 MG/1
975 TABLET ORAL ONCE
Status: DISCONTINUED | OUTPATIENT
Start: 2023-11-14 | End: 2023-11-14 | Stop reason: HOSPADM

## 2023-11-14 RX ORDER — DEXAMETHASONE SODIUM PHOSPHATE 4 MG/ML
INJECTION, SOLUTION INTRA-ARTICULAR; INTRALESIONAL; INTRAMUSCULAR; INTRAVENOUS; SOFT TISSUE AS NEEDED
Status: DISCONTINUED | OUTPATIENT
Start: 2023-11-14 | End: 2023-11-14

## 2023-11-14 RX ORDER — PHENYLEPHRINE HCL IN 0.9% NACL 1 MG/10 ML
SYRINGE (ML) INTRAVENOUS AS NEEDED
Status: DISCONTINUED | OUTPATIENT
Start: 2023-11-14 | End: 2023-11-14

## 2023-11-14 RX ORDER — NORETHINDRONE AND ETHINYL ESTRADIOL 0.5-0.035
KIT ORAL AS NEEDED
Status: DISCONTINUED | OUTPATIENT
Start: 2023-11-14 | End: 2023-11-14

## 2023-11-14 RX ORDER — HYDROMORPHONE HYDROCHLORIDE 1 MG/ML
0.2 INJECTION, SOLUTION INTRAMUSCULAR; INTRAVENOUS; SUBCUTANEOUS EVERY 5 MIN PRN
Status: DISCONTINUED | OUTPATIENT
Start: 2023-11-14 | End: 2023-11-14 | Stop reason: HOSPADM

## 2023-11-14 RX ORDER — ONDANSETRON HYDROCHLORIDE 2 MG/ML
INJECTION, SOLUTION INTRAVENOUS AS NEEDED
Status: DISCONTINUED | OUTPATIENT
Start: 2023-11-14 | End: 2023-11-14

## 2023-11-14 RX ORDER — ONDANSETRON HYDROCHLORIDE 2 MG/ML
4 INJECTION, SOLUTION INTRAVENOUS ONCE AS NEEDED
Status: DISCONTINUED | OUTPATIENT
Start: 2023-11-14 | End: 2023-11-14 | Stop reason: HOSPADM

## 2023-11-14 RX ORDER — LABETALOL HYDROCHLORIDE 5 MG/ML
5 INJECTION, SOLUTION INTRAVENOUS ONCE AS NEEDED
Status: DISCONTINUED | OUTPATIENT
Start: 2023-11-14 | End: 2023-11-14 | Stop reason: HOSPADM

## 2023-11-14 RX ORDER — ALBUTEROL SULFATE 0.83 MG/ML
2.5 SOLUTION RESPIRATORY (INHALATION) ONCE AS NEEDED
Status: DISCONTINUED | OUTPATIENT
Start: 2023-11-14 | End: 2023-11-14 | Stop reason: HOSPADM

## 2023-11-14 RX ORDER — HYDRALAZINE HYDROCHLORIDE 20 MG/ML
5 INJECTION INTRAMUSCULAR; INTRAVENOUS EVERY 30 MIN PRN
Status: DISCONTINUED | OUTPATIENT
Start: 2023-11-14 | End: 2023-11-14 | Stop reason: HOSPADM

## 2023-11-14 RX ORDER — LIDOCAINE HYDROCHLORIDE 20 MG/ML
INJECTION, SOLUTION EPIDURAL; INFILTRATION; INTRACAUDAL; PERINEURAL AS NEEDED
Status: DISCONTINUED | OUTPATIENT
Start: 2023-11-14 | End: 2023-11-14

## 2023-11-14 RX ORDER — SODIUM CHLORIDE, SODIUM LACTATE, POTASSIUM CHLORIDE, CALCIUM CHLORIDE 600; 310; 30; 20 MG/100ML; MG/100ML; MG/100ML; MG/100ML
100 INJECTION, SOLUTION INTRAVENOUS CONTINUOUS
Status: DISCONTINUED | OUTPATIENT
Start: 2023-11-14 | End: 2023-11-14 | Stop reason: HOSPADM

## 2023-11-14 RX ORDER — LIDOCAINE HYDROCHLORIDE 10 MG/ML
0.1 INJECTION, SOLUTION EPIDURAL; INFILTRATION; INTRACAUDAL; PERINEURAL ONCE
Status: DISCONTINUED | OUTPATIENT
Start: 2023-11-14 | End: 2023-11-14 | Stop reason: HOSPADM

## 2023-11-14 RX ORDER — FENTANYL CITRATE 50 UG/ML
INJECTION, SOLUTION INTRAMUSCULAR; INTRAVENOUS AS NEEDED
Status: DISCONTINUED | OUTPATIENT
Start: 2023-11-14 | End: 2023-11-14

## 2023-11-14 RX ORDER — OXYCODONE HYDROCHLORIDE 5 MG/1
5 TABLET ORAL EVERY 4 HOURS PRN
Status: DISCONTINUED | OUTPATIENT
Start: 2023-11-14 | End: 2023-11-14 | Stop reason: HOSPADM

## 2023-11-14 RX ORDER — HYDROMORPHONE HYDROCHLORIDE 1 MG/ML
0.5 INJECTION, SOLUTION INTRAMUSCULAR; INTRAVENOUS; SUBCUTANEOUS EVERY 5 MIN PRN
Status: DISCONTINUED | OUTPATIENT
Start: 2023-11-14 | End: 2023-11-14 | Stop reason: HOSPADM

## 2023-11-14 RX ORDER — GLYCOPYRROLATE 0.2 MG/ML
INJECTION INTRAMUSCULAR; INTRAVENOUS AS NEEDED
Status: DISCONTINUED | OUTPATIENT
Start: 2023-11-14 | End: 2023-11-14

## 2023-11-14 RX ORDER — PROPOFOL 10 MG/ML
INJECTION, EMULSION INTRAVENOUS AS NEEDED
Status: DISCONTINUED | OUTPATIENT
Start: 2023-11-14 | End: 2023-11-14

## 2023-11-14 RX ORDER — ACETAMINOPHEN 500 MG
1000 TABLET ORAL EVERY 6 HOURS PRN
COMMUNITY
Start: 2023-11-14 | End: 2023-11-28 | Stop reason: ALTCHOICE

## 2023-11-14 RX ORDER — MIDAZOLAM HYDROCHLORIDE 1 MG/ML
INJECTION, SOLUTION INTRAMUSCULAR; INTRAVENOUS AS NEEDED
Status: DISCONTINUED | OUTPATIENT
Start: 2023-11-14 | End: 2023-11-14

## 2023-11-14 RX ORDER — OXYCODONE AND ACETAMINOPHEN 5; 325 MG/1; MG/1
1 TABLET ORAL EVERY 4 HOURS PRN
Status: DISCONTINUED | OUTPATIENT
Start: 2023-11-14 | End: 2023-11-14 | Stop reason: HOSPADM

## 2023-11-14 RX ADMIN — FENTANYL CITRATE 50 MCG: 50 INJECTION, SOLUTION INTRAMUSCULAR; INTRAVENOUS at 12:47

## 2023-11-14 RX ADMIN — Medication 50 MCG: at 13:01

## 2023-11-14 RX ADMIN — LIDOCAINE HYDROCHLORIDE 50 MG: 20 INJECTION, SOLUTION EPIDURAL; INFILTRATION; INTRACAUDAL; PERINEURAL at 12:47

## 2023-11-14 RX ADMIN — GLYCOPYRROLATE 0.2 MG: 0.2 INJECTION, SOLUTION INTRAMUSCULAR; INTRAVENOUS at 13:02

## 2023-11-14 RX ADMIN — MIDAZOLAM 2 MG: 1 INJECTION INTRAMUSCULAR; INTRAVENOUS at 12:40

## 2023-11-14 RX ADMIN — PROPOFOL 160 MG: 10 INJECTION, EMULSION INTRAVENOUS at 12:47

## 2023-11-14 RX ADMIN — EPHEDRINE SULFATE 10 MG: 50 INJECTION, SOLUTION INTRAVENOUS at 13:06

## 2023-11-14 RX ADMIN — SODIUM CHLORIDE, SODIUM LACTATE, POTASSIUM CHLORIDE, AND CALCIUM CHLORIDE: 600; 310; 30; 20 INJECTION, SOLUTION INTRAVENOUS at 12:36

## 2023-11-14 RX ADMIN — ONDANSETRON 4 MG: 2 INJECTION INTRAMUSCULAR; INTRAVENOUS at 12:54

## 2023-11-14 RX ADMIN — DEXAMETHASONE SODIUM PHOSPHATE 4 MG: 4 INJECTION, SOLUTION INTRAMUSCULAR; INTRAVENOUS at 12:54

## 2023-11-14 RX ADMIN — PROPOFOL 40 MG: 10 INJECTION, EMULSION INTRAVENOUS at 12:48

## 2023-11-14 SDOH — HEALTH STABILITY: MENTAL HEALTH: CURRENT SMOKER: 0

## 2023-11-14 ASSESSMENT — PAIN SCALES - GENERAL
PAINLEVEL_OUTOF10: 0 - NO PAIN

## 2023-11-14 ASSESSMENT — PAIN - FUNCTIONAL ASSESSMENT
PAIN_FUNCTIONAL_ASSESSMENT: 0-10

## 2023-11-14 ASSESSMENT — COLUMBIA-SUICIDE SEVERITY RATING SCALE - C-SSRS
1. IN THE PAST MONTH, HAVE YOU WISHED YOU WERE DEAD OR WISHED YOU COULD GO TO SLEEP AND NOT WAKE UP?: NO
2. HAVE YOU ACTUALLY HAD ANY THOUGHTS OF KILLING YOURSELF?: NO
6. HAVE YOU EVER DONE ANYTHING, STARTED TO DO ANYTHING, OR PREPARED TO DO ANYTHING TO END YOUR LIFE?: NO

## 2023-11-14 NOTE — OP NOTE
Date: 2023  OR Location: [facility] OR    Name: Patsy Mac, : 1970, Age: 53 y.o., MRN: [id number], Sex: female    Diagnosis  Pre-op Diagnosis     * Postmenopausal bleeding [N95.0] Post-op Diagnosis     * Postmenopausal bleeding [N95.0]     Procedures  Hysteroscopy with Excision Tissue  00759 - LA HYSTEROSCOPY BX ENDOMETRIUM&/POLYPC W/WO D&C      Surgeons      * Anai Shea - Primary    Resident/Fellow/Other Assistant:  Surgeon(s) and Role:    Procedure Summary  Anesthesia: General  ASA: II  Anesthesia Staff: Anesthesiologist: Jamshid Shukla MD  C-AA: YONATAN Aguilera  Estimated Blood Loss: 5mL  Intra-op Medications: * No intraprocedure medications in log *           Anesthesia Record               Intraprocedure I/O Totals          Intake    .00 mL    Propofol Drip 0.00 mL    The total shown is the total volume documented since Anesthesia Start was filed.    Total Intake 800 mL       Output    Est. Blood Loss 5 mL    Total Output 5 mL       Net    Net Volume 795 mL          Specimen:   ID Type Source Tests Collected by Time   1 : ENDOMETRIAL CURATAGE Tissue ENDOMETRIUM CURETTINGS SURGICAL PATHOLOGY EXAM Anai Shea MD 2023 1316   2 : ENDOMETRIAL POLYP Tissue ENDOMETRIUM POLYPECTOMY - HYSTEROSCOPIC SURGICAL PATHOLOGY EXAM Anai Shea MD 2023 1316        Staff:   Circulator: Laure Valenzuela, RN; Glenis Herron RN  Scrub Person: Nathaly Fernandez    Indication: 52 y/o with ER/LA+ breast cancer on tamoxifen presenting for diagnostic hysteroscopy, possible polypectomy, endometrial sampling for postmenopausal bleeding    Findings: uterus sounded to 8cm. Large endometrial polyp occupying entire uterine cavity. Bilateral ostia unable to be visualized.     Procedure: The patient was taken to the operating room where anesthesia was administered. She was then positioned in the dorsal lithotomy position and a bimanual exam was performed. She was prepped and draped in the normal  sterile fashion. A metal speculum was placed in the vagina. The anterior lip of the cervix was then grasped with a single-tooth tenaculum. The cervix was dilated to 15 Turkmen using Renner dilators. A 4.6mm (Coral) Aveta hysteroscope was then inserted through the cervical canal and the interior of the uterus was examined. A large endometrial polyp was found to be taking up the entirety of the uterine cavity. The ostia were not able to be visualized around the polyp. The Flex resection device was inserted and the polyp was resected. The hysteroscope was then removed (fluid deficit 1150cc though the majority of this fluid leaked around the drape onto the floor). A 1 x 0.5 cm fragment of polyp was expelled with removal of the hysteroscope. Sharp curettage was then performed on all four walls of the uterus until a satisfactory uterine cry was felt. The single tooth tenaculum was removed and the cervix was found to be hemostatic. The speculum was removed. The patient tolerated the procedure well. Anesthesia was reversed without difficulty. All counts were correct.     Anai Shea MD

## 2023-11-14 NOTE — ANESTHESIA PROCEDURE NOTES
Airway  Date/Time: 11/14/2023 12:48 PM  Urgency: elective    Airway not difficult    Staffing  Performed: CAA   Authorized by: Jamshid Shukla MD    Performed by: YONATAN Aguilera  Patient location during procedure: OR    Indications and Patient Condition  Indications for airway management: anesthesia  Spontaneous Ventilation: absent  Sedation level: deep  Preoxygenated: yes  MILS not maintained throughout  Mask difficulty assessment: 1 - vent by mask    Final Airway Details  Final airway type: supraglottic airway      Successful airway: unique  Size 3     Number of attempts at approach: 1

## 2023-11-14 NOTE — ANESTHESIA POSTPROCEDURE EVALUATION
Patient: Patsy Mac    Procedure Summary       Date: 11/14/23 Room / Location: [facility] OR 05 / Virtual [facility] OR    Anesthesia Start: 1239 Anesthesia Stop:     Procedure: Hysteroscopy with Excision Tissue (Vagina ) Diagnosis:       Postmenopausal bleeding      (Postmenopausal bleeding [N95.0])    Surgeons: Anai Shea MD Responsible Provider: Jamshid Shukla MD    Anesthesia Type: general ASA Status: 2            Anesthesia Type: general    Vitals Value Taken Time   /96 11/14/23 1330   Temp 36.2 °C (97.2 °F) 11/14/23 1330   Pulse 98 11/14/23 1330   Resp 14 11/14/23 1330   SpO2 100 % 11/14/23 1330   Vitals shown include unvalidated device data.    Anesthesia Post Evaluation    Patient location during evaluation: PACU  Patient participation: complete - patient participated  Level of consciousness: awake and alert  Pain management: satisfactory to patient  Multimodal analgesia pain management approach  Airway patency: patent  Cardiovascular status: acceptable, blood pressure returned to baseline and hemodynamically stable  Respiratory status: acceptable, room air, nonlabored ventilation and spontaneous ventilation  Hydration status: acceptable  Postoperative Nausea and Vomiting: none        No notable events documented.

## 2023-11-14 NOTE — ANESTHESIA PREPROCEDURE EVALUATION
Patient: Patsy Mac    Procedure Information       Date/Time: 11/14/23 1230    Procedure: Hysteroscopy with Excision Tissue - Aveta    Location: [facility] OR 05 / [facility] OR    Surgeons: Anai Shea MD            Relevant Problems   No relevant active problems       Clinical information reviewed:   Tobacco  Allergies  Meds   Med Hx  Surg Hx   Fam Hx  Soc Hx        NPO Detail:  No data recorded     Physical Exam    Airway  Mallampati: II  TM distance: >3 FB     Cardiovascular   Rhythm: regular  Rate: normal     Dental - normal exam     Pulmonary - normal exam     Abdominal            Anesthesia Plan    ASA 2     general     The patient is not a current smoker.    intravenous induction   Anesthetic plan and risks discussed with patient.    Plan discussed with CAA.

## 2023-11-14 NOTE — H&P
Subjective   Patient ID [id number]   Patsy Mac is a 53 y.o. with ER/GA+ breast cancer on tamoxifen presenting for diagnostic hysteroscopy, possible polypectomy, and endometrial sampling for postmenopausal bleeding. She is s/p office EMB and TVUS as below. Her breast provider is holding her tamoxifen pending workup of PMB.    Pre-Op Labs (23): hgb 11.6, Cr 0.88  TVUS (10/23/23): uterus measures 8.7 x 4.2 x 5.3 cm. Endometrium measures 1.8cm. Heterogenous and hypervascular with cystic changes  EMB (10/12/23):  Fragment of necrotic/hyalinized endometrial tissue with reactive epithelium, cannot exclude origin from an infarcted endometrial polyp. Scant superficial strips of inactive endometrium.    Pap History  -2019: NIL/HRHPV neg  -2016: NIL, +endometrial cells -> EMB proliferative  -2015: NIL, +endometrial cells -> EMB disordered proliferative endometrium with tubal metaplasia     OBHx:  x2  PMHx: ER/GA positive invasive lobular carcinoma of left breast s/p lumpectomy in 2018 now on tamoxifen, HTN, osteopenia, basal cell carcinoma  SurgHx: breast surgery, denies abdominal surgeries  Meds/Allergies: updated  FHx: daughter  of kidney cancer at age 12, maternal grandmother with ovarian cancer in her 50s, mother with breast cancer in her 70s, patient s/p negative genetic testing    Objective   Physical Exam  Vitals:    23 1200   BP: (!) 182/91   Pulse: 51   Resp: 16   Temp: 37 °C (98.6 °F)   SpO2: 100%        Gen: awake, alert  Head: NCAT  HEENT: moist mucus membranes  Pulm: breathing comfortably on room air  CV: warm and well-perfused  MSK: moving all four extremities  Neuro: alert and oriented  Psych: appropriate affect     Assessment/Plan     Patsy Mac is a 53 y.o. with ER/GA+ breast cancer on tamoxifen presenting for diagnostic hysteroscopy, possible polypectomy, and endometrial sampling for postmenopausal bleeding.   -Pre-op hgb 11.6, T&S  -Pre-op abx: not indicated  -Dispo:  anticipate same day discharge    Anai Shea MD

## 2023-11-28 ENCOUNTER — OFFICE VISIT (OUTPATIENT)
Dept: OBSTETRICS AND GYNECOLOGY | Facility: CLINIC | Age: 53
End: 2023-11-28
Payer: COMMERCIAL

## 2023-11-28 ENCOUNTER — TELEPHONE (OUTPATIENT)
Dept: HEMATOLOGY/ONCOLOGY | Facility: CLINIC | Age: 53
End: 2023-11-28

## 2023-11-28 VITALS
BODY MASS INDEX: 19.96 KG/M2 | WEIGHT: 109.13 LBS | DIASTOLIC BLOOD PRESSURE: 82 MMHG | SYSTOLIC BLOOD PRESSURE: 120 MMHG

## 2023-11-28 DIAGNOSIS — N95.0 POSTMENOPAUSAL BLEEDING: Primary | ICD-10-CM

## 2023-11-28 DIAGNOSIS — N84.0 ENDOMETRIAL POLYP: ICD-10-CM

## 2023-11-28 PROCEDURE — 3079F DIAST BP 80-89 MM HG: CPT | Performed by: STUDENT IN AN ORGANIZED HEALTH CARE EDUCATION/TRAINING PROGRAM

## 2023-11-28 PROCEDURE — 3074F SYST BP LT 130 MM HG: CPT | Performed by: STUDENT IN AN ORGANIZED HEALTH CARE EDUCATION/TRAINING PROGRAM

## 2023-11-28 PROCEDURE — 99213 OFFICE O/P EST LOW 20 MIN: CPT | Performed by: STUDENT IN AN ORGANIZED HEALTH CARE EDUCATION/TRAINING PROGRAM

## 2023-11-28 PROCEDURE — 1036F TOBACCO NON-USER: CPT | Performed by: STUDENT IN AN ORGANIZED HEALTH CARE EDUCATION/TRAINING PROGRAM

## 2023-11-28 NOTE — TELEPHONE ENCOUNTER
Received this message:     Chico Mackenzie,     I just wanted to let you know that this patient had an uncomplicated hysteroscopic polypectomy and path was benign. She is fine to resume her tamoxifen from my standpoint     Thanks!   Anai Shea         I called patient to verify. Got her VM. Left message.

## 2023-11-28 NOTE — PROGRESS NOTES
Subjective   Patient ID [id number]   Patsy Mac is a 53 y.o. with PMB and ER/MT+ breast cancer on tamoxifen presenting for postop visit. On 11/14, patient underwent uncomplicated diagnostic hysteroscopy, polypectomy, and endometrial sampling. Path c/w benign endometrial polyp and scant proliferative endometrium. Patient reports some spotting and cramping after the procedure that has since resolved. She is otherwise tolerating a regular diet, ambulating, voiding, and having BMs.    Objective   Physical Exam  Vitals:    11/28/23 0847   BP: 120/82     Gen: awake, alert  Head: NCAT  HEENT: moist mucus membranes  Pulm: breathing comfortably on room air  CV: warm and well-perfused  Neuro: alert and oriented  Psych: appropriate affect     Assessment/Plan     Patsy Mac is a 53 y.o. with PMB and ER/MT+ breast cancer on tamoxifen presenting for postop visit after diagnostic hysteroscopy, polypectomy, and endometrial sampling.    PMB  -s/p uncomplicated diagnostic hysteroscopy with polypectomy and endometrial sampling on 11/14  -Path reviewed, consistent with benign polyp  -Meeting all post-op milestones  -Okay to resume tamoxifen from gyn standpoint    Follow up for annual exam or sooner as indicated    Anai Shea MD

## 2023-11-29 ENCOUNTER — OFFICE VISIT (OUTPATIENT)
Dept: ENDOCRINOLOGY | Facility: HOSPITAL | Age: 53
End: 2023-11-29
Payer: COMMERCIAL

## 2023-11-29 ENCOUNTER — APPOINTMENT (OUTPATIENT)
Dept: HEMATOLOGY/ONCOLOGY | Facility: CLINIC | Age: 53
End: 2023-11-29
Payer: COMMERCIAL

## 2023-11-29 VITALS
BODY MASS INDEX: 18.77 KG/M2 | WEIGHT: 102 LBS | HEART RATE: 59 BPM | HEIGHT: 62 IN | DIASTOLIC BLOOD PRESSURE: 88 MMHG | SYSTOLIC BLOOD PRESSURE: 138 MMHG | TEMPERATURE: 96.6 F | OXYGEN SATURATION: 100 %

## 2023-11-29 DIAGNOSIS — E55.9 VITAMIN D INSUFFICIENCY: ICD-10-CM

## 2023-11-29 DIAGNOSIS — C50.412 MALIGNANT NEOPLASM OF UPPER-OUTER QUADRANT OF LEFT FEMALE BREAST, UNSPECIFIED ESTROGEN RECEPTOR STATUS (MULTI): ICD-10-CM

## 2023-11-29 DIAGNOSIS — M85.89 OSTEOPENIA OF MULTIPLE SITES: Primary | ICD-10-CM

## 2023-11-29 LAB
25(OH)D3 SERPL-MCNC: 50 NG/ML (ref 30–100)
ALBUMIN SERPL BCP-MCNC: 4.4 G/DL (ref 3.4–5)
ANION GAP SERPL CALC-SCNC: 14 MMOL/L (ref 10–20)
BUN SERPL-MCNC: 11 MG/DL (ref 6–23)
CALCIUM SERPL-MCNC: 10 MG/DL (ref 8.6–10.6)
CHLORIDE SERPL-SCNC: 102 MMOL/L (ref 98–107)
CO2 SERPL-SCNC: 28 MMOL/L (ref 21–32)
CREAT SERPL-MCNC: 0.83 MG/DL (ref 0.5–1.05)
ERYTHROCYTE [DISTWIDTH] IN BLOOD BY AUTOMATED COUNT: 12.5 % (ref 11.5–14.5)
GFR SERPL CREATININE-BSD FRML MDRD: 83 ML/MIN/1.73M*2
GLUCOSE SERPL-MCNC: 79 MG/DL (ref 74–99)
HCT VFR BLD AUTO: 39.7 % (ref 36–46)
HGB BLD-MCNC: 13.3 G/DL (ref 12–16)
MCH RBC QN AUTO: 30.4 PG (ref 26–34)
MCHC RBC AUTO-ENTMCNC: 33.5 G/DL (ref 32–36)
MCV RBC AUTO: 91 FL (ref 80–100)
NRBC BLD-RTO: 0 /100 WBCS (ref 0–0)
PHOSPHATE SERPL-MCNC: 3.9 MG/DL (ref 2.5–4.9)
PLATELET # BLD AUTO: 253 X10*3/UL (ref 150–450)
POTASSIUM SERPL-SCNC: 4.1 MMOL/L (ref 3.5–5.3)
PTH-INTACT SERPL-MCNC: 23.8 PG/ML (ref 18.5–88)
RBC # BLD AUTO: 4.37 X10*6/UL (ref 4–5.2)
SODIUM SERPL-SCNC: 140 MMOL/L (ref 136–145)
TSH SERPL-ACNC: 1.35 MIU/L (ref 0.44–3.98)
WBC # BLD AUTO: 4.4 X10*3/UL (ref 4.4–11.3)

## 2023-11-29 PROCEDURE — 3079F DIAST BP 80-89 MM HG: CPT | Performed by: STUDENT IN AN ORGANIZED HEALTH CARE EDUCATION/TRAINING PROGRAM

## 2023-11-29 PROCEDURE — 82306 VITAMIN D 25 HYDROXY: CPT | Performed by: STUDENT IN AN ORGANIZED HEALTH CARE EDUCATION/TRAINING PROGRAM

## 2023-11-29 PROCEDURE — 36415 COLL VENOUS BLD VENIPUNCTURE: CPT | Performed by: STUDENT IN AN ORGANIZED HEALTH CARE EDUCATION/TRAINING PROGRAM

## 2023-11-29 PROCEDURE — 3075F SYST BP GE 130 - 139MM HG: CPT | Performed by: STUDENT IN AN ORGANIZED HEALTH CARE EDUCATION/TRAINING PROGRAM

## 2023-11-29 PROCEDURE — 85027 COMPLETE CBC AUTOMATED: CPT | Performed by: STUDENT IN AN ORGANIZED HEALTH CARE EDUCATION/TRAINING PROGRAM

## 2023-11-29 PROCEDURE — 80069 RENAL FUNCTION PANEL: CPT | Performed by: STUDENT IN AN ORGANIZED HEALTH CARE EDUCATION/TRAINING PROGRAM

## 2023-11-29 PROCEDURE — 1036F TOBACCO NON-USER: CPT | Performed by: STUDENT IN AN ORGANIZED HEALTH CARE EDUCATION/TRAINING PROGRAM

## 2023-11-29 PROCEDURE — 83970 ASSAY OF PARATHORMONE: CPT | Performed by: STUDENT IN AN ORGANIZED HEALTH CARE EDUCATION/TRAINING PROGRAM

## 2023-11-29 PROCEDURE — 84443 ASSAY THYROID STIM HORMONE: CPT | Performed by: STUDENT IN AN ORGANIZED HEALTH CARE EDUCATION/TRAINING PROGRAM

## 2023-11-29 PROCEDURE — 99215 OFFICE O/P EST HI 40 MIN: CPT | Performed by: STUDENT IN AN ORGANIZED HEALTH CARE EDUCATION/TRAINING PROGRAM

## 2023-11-29 ASSESSMENT — ENCOUNTER SYMPTOMS
DEPRESSION: 0
LOSS OF SENSATION IN FEET: 0
OCCASIONAL FEELINGS OF UNSTEADINESS: 0

## 2023-11-29 ASSESSMENT — LIFESTYLE VARIABLES
HOW MANY STANDARD DRINKS CONTAINING ALCOHOL DO YOU HAVE ON A TYPICAL DAY: 1 OR 2
HOW OFTEN DO YOU HAVE A DRINK CONTAINING ALCOHOL: MONTHLY OR LESS
HOW OFTEN DO YOU HAVE SIX OR MORE DRINKS ON ONE OCCASION: LESS THAN MONTHLY
SKIP TO QUESTIONS 9-10: 0
AUDIT-C TOTAL SCORE: 2

## 2023-11-29 ASSESSMENT — PATIENT HEALTH QUESTIONNAIRE - PHQ9
SUM OF ALL RESPONSES TO PHQ9 QUESTIONS 1 & 2: 0
2. FEELING DOWN, DEPRESSED OR HOPELESS: NOT AT ALL
1. LITTLE INTEREST OR PLEASURE IN DOING THINGS: NOT AT ALL

## 2023-11-29 NOTE — PROGRESS NOTES
"Subjective    Ms. Mac is a 53-year-old lady with history of breast cancer ER positive [place] positive HER2 negative status post left partial mastectomy in 2018 and left breast radiation and tamoxifen coming in for management of osteopenia   Uterine polyp removed. Has been off tamoxifen for > 1 month. Was on it for 5 years. Patient reports being worried about polyp recurrence with tamoxifen and risk for cancer.   Blood work  TSH: 1.92  Vitamin D: 39 March 2022- before that was 29 in November   CoCa: 9.2  Bone density checked July 2022 showed Osteopenia  weight bearing exercises  Calcium 600 mg and Vitamin D 400 IU BID  Vitamin D 2000 IU  Received Alendronate once a week for a month in the summer but was causing GI issues and  Tamoxifen 2018. Started having hot flashes.   No menses since after the breast cancer   No chemotherapy only tamoxifen  No back pain  Mother had osteoporosis  Mother had hip fx and shoulder fx after trauma in 2021. Had breast Ca at 70 and had radiation.   No GCs  No smoking  No excess alcohol  No Diabetes  No RA  No early menopause     FRAX: 11.7% for major osteoporotic Fx and Hip Fx 0.7%   No falls  No fractures  No kidney stones  Working out. Run long distance running, elliptical and weight     Personal Hx of fracture as an adult: no  Hx of fracture in first-degree relative: yes - Mother hip fx   race: yes  Advanced age: no  Female sex: yes  Dementia: no  Poor health/frailty: no     Potentially modifiable:  Tobacco use: no  Low body weight (<127 lbs): yes  Estrogen deficiency  Recurrent falls: no  Inadequate physical activity: no  Review of Systems  all pertinent systems reviewed and are otherwise negative   Objective   /88 (BP Location: Right arm, Patient Position: Sitting, BP Cuff Size: Adult)   Pulse 59   Temp 35.9 °C (96.6 °F) (Temporal)   Ht 1.575 m (5' 2\")   Wt 46.3 kg (102 lb)   SpO2 100%   BMI 18.66 kg/m²   Physical Exam  Constitutional:       General: She is not in " "acute distress.     Appearance: Normal appearance.   Eyes:      Extraocular Movements: Extraocular movements intact.      Pupils: Pupils are equal, round, and reactive to light.   Cardiovascular:      Rate and Rhythm: Normal rate and regular rhythm.   Pulmonary:      Effort: Pulmonary effort is normal. No respiratory distress.      Breath sounds: Normal breath sounds.   Abdominal:      General: Bowel sounds are normal.      Palpations: Abdomen is soft.      Tenderness: There is no abdominal tenderness.   Skin:     Coloration: Skin is not jaundiced or pale.      Findings: No erythema or rash.   Neurological:      General: No focal deficit present.      Mental Status: She is alert and oriented to person, place, and time.      Deep Tendon Reflexes: Reflexes normal.   Psychiatric:         Mood and Affect: Mood normal.         Behavior: Behavior normal.          === 07/15/22 ===    BONE DENSITY     Lab Review  Lab Results   Component Value Date    CALCIUM 9.4 11/09/2023     Lab Results   Component Value Date    CREATININE 0.88 11/09/2023     No results found for: \"EGFRMUTATION\"  Lab Results   Component Value Date    PTH 79.9 11/29/2022     Lab Results   Component Value Date    VITD25 38 11/29/2022         Assessment/Plan    Ms. Mac is a 52-year-old lady with history of breast cancer ER positive [place] positive HER2 negative status post left partial mastectomy in 2018 and left breast radiation and tamoxifen coming in for management of osteopenia   Uterine polyp removed. Has been off tamoxifen for > 1 month. Was on it for 5 years. Patient reports being worried about polyp recurrence with tamoxifen and risk for cancer.   Blood work  TSH: 1.92  Vitamin D: 39 March 2022- before that was 29 in November   CoCa: 9.2  Bone density checked July 2022 showed Osteopenia  weight bearing exercises  Calcium 600 mg and Vitamin D 400 IU BID  Vitamin D 2000 IU  Received Alendronate once a week for a month in the summer but was causing GI " issues so stopped.  Tamoxifen 2018. Started having hot flashes.   Mother had hip fx and shoulder fx after trauma in 2021. Had breast Ca at 70 and had radiation.   FRAX: 11.7% for major osteoporotic Fx and Hip Fx 0.7%    Plan:  Check RFP, PTH, vitamin D and TSH  Continue Calcium and vitamin D supplementation.  Continue weightbearing exercises  We asked patient to let us know if tamoxifen is stopped (will follow with Dr. Harry)  Repeat DXA in July 2024    RTC in July/August

## 2023-11-29 NOTE — PATIENT INSTRUCTIONS
We will check blood work to evaluate calcium parathyroid and vitamin D  Continue calcium supplements and vitamin D  Let me know if they stop tamoxifen  Continue weightbearing exercises  Follow with Dr. Goldy Harry oncologist    RTC in July/August (Call around January and schedule)

## 2023-12-06 ENCOUNTER — OFFICE VISIT (OUTPATIENT)
Dept: HEMATOLOGY/ONCOLOGY | Facility: CLINIC | Age: 53
End: 2023-12-06
Payer: COMMERCIAL

## 2023-12-06 VITALS
HEART RATE: 53 BPM | RESPIRATION RATE: 14 BRPM | TEMPERATURE: 98.1 F | OXYGEN SATURATION: 100 % | DIASTOLIC BLOOD PRESSURE: 100 MMHG | SYSTOLIC BLOOD PRESSURE: 150 MMHG | BODY MASS INDEX: 20.37 KG/M2 | WEIGHT: 110.67 LBS

## 2023-12-06 DIAGNOSIS — C50.412 MALIGNANT NEOPLASM OF UPPER-OUTER QUADRANT OF LEFT FEMALE BREAST, UNSPECIFIED ESTROGEN RECEPTOR STATUS (MULTI): ICD-10-CM

## 2023-12-06 PROCEDURE — 3080F DIAST BP >= 90 MM HG: CPT | Performed by: STUDENT IN AN ORGANIZED HEALTH CARE EDUCATION/TRAINING PROGRAM

## 2023-12-06 PROCEDURE — 1036F TOBACCO NON-USER: CPT | Performed by: STUDENT IN AN ORGANIZED HEALTH CARE EDUCATION/TRAINING PROGRAM

## 2023-12-06 PROCEDURE — 99215 OFFICE O/P EST HI 40 MIN: CPT | Performed by: STUDENT IN AN ORGANIZED HEALTH CARE EDUCATION/TRAINING PROGRAM

## 2023-12-06 PROCEDURE — 3077F SYST BP >= 140 MM HG: CPT | Performed by: STUDENT IN AN ORGANIZED HEALTH CARE EDUCATION/TRAINING PROGRAM

## 2023-12-06 PROCEDURE — 99205 OFFICE O/P NEW HI 60 MIN: CPT | Performed by: STUDENT IN AN ORGANIZED HEALTH CARE EDUCATION/TRAINING PROGRAM

## 2023-12-06 ASSESSMENT — ENCOUNTER SYMPTOMS
OCCASIONAL FEELINGS OF UNSTEADINESS: 0
LOSS OF SENSATION IN FEET: 0
DEPRESSION: 0

## 2023-12-06 ASSESSMENT — PAIN SCALES - GENERAL: PAINLEVEL: 0-NO PAIN

## 2023-12-06 NOTE — PROGRESS NOTES
Patient ID: Patsy Mac is a 53 y.o. female.      Cancer History:          Breast         AJCC Edition: 8th (AJCC), Diagnosis Date: 02-Feb-2018, IA, pT2 pN0 cM0 G2        Treatment History:    1. Palpable left breast mass 12/2017, Imaging showed suspicious mass left breast.   2. Left breast core biopsy 12/15/17 showed invasive lobular carcinoma, grade 2, ER >95% RI 50% HER2 0. Right breast core biopsy for MRI findings benign 1/3/18.    3. Genetic testing is negative for BRCA and panel testing My Risk (VUS in BARD1 and CHEK2).  4. Left partial mastectomy and SLN 2/2/18 showed invasive lobular carcinoma 2.8 cm, 0/2 SLN, margins positive, Oncotype DX RS 15. Reexcision 2/28/18 negative other than LCIS.   5. Left breast radiation with 42.56 Gy in 16 fractions followed by a boost of 12 Gy in 6 fractions completed 5/3/18.  6. Started tamoxifen 5/20/18.      History of Present Illness (HPI)/Interval History:    Doing well overall,     She denies any fevers or chills.    She denies any chest pain or breathing issues.     She denies any vision changes or headache issues, dizziness, loss of balance, neuropathy and no falls.     She denies any new or unexplained bone aches or pains.    She reports a normal appetite and normal bowel movements. Her weight is stable.     She denies any issues with sleep or fatigue.       Energy is stable. She does active. Exercises.       Review of Systems:  14-point ROS otherwise negative, as per HPI.    Past Medical History:   Diagnosis Date    Achilles tendinitis, left leg 11/30/2017    Achilles tendinitis of left lower extremity    Anaphylactic shock, unspecified, initial encounter 04/17/2015    Exercise induced anaphylaxis    Anemia     Cancer (CMS/[facility])     Chalazion left eye, unspecified eyelid 04/09/2015    Acute chalazion of left eye    Cough, unspecified 07/01/2014    Cough    Decreased white blood cell count, unspecified 05/21/2018    Decreased white blood cell count    Essential  (primary) hypertension 10/10/2022    Hypertension    Follicular cyst of the skin and subcutaneous tissue, unspecified 07/18/2019    Skin cyst    Hordeolum externum left eye, unspecified eyelid 05/12/2016    Hordeolum externum of left eye    Hordeolum externum left eye, unspecified eyelid 04/09/2015    Hordeolum externum of left eye    Hordeolum externum unspecified eye, unspecified eyelid 08/27/2015    Stye    Hordeolum externum unspecified eye, unspecified eyelid 08/27/2015    Stye    Hordeolum externum unspecified eye, unspecified eyelid 01/06/2019    Stye    Lesion of sciatic nerve, right lower limb 06/04/2021    Piriformis syndrome of right side    Melena 08/18/2015    Hematochezia    Other conditions influencing health status 08/25/2020    Colonoscopy planned    Other conditions influencing health status 02/19/2018    Seroma, postoperative    Other general symptoms and signs 10/18/2020    Flu-like symptoms    Other specified disorders of eye and adnexa 04/07/2015    Eye irritation    Pain in left ankle and joints of left foot     Left ankle pain    Pain in left wrist 03/06/2017    Left wrist pain    Pain in leg, unspecified 01/28/2019    Lower extremity pain    Pain in right finger(s) 01/21/2019    Pain of right thumb    Pain in unspecified shoulder 01/28/2019    Shoulder pain    Peroneal tendinitis, left leg 07/13/2020    Peroneal tendinitis of left lower extremity    Personal history of other diseases of the digestive system 09/01/2015    History of acute gastritis    Personal history of other diseases of the musculoskeletal system and connective tissue 05/10/2019    History of sacroiliac joint dysfunction    Personal history of other diseases of the musculoskeletal system and connective tissue 05/10/2019    History of low back pain    Personal history of other diseases of the respiratory system 01/06/2014    History of sinusitis    Personal history of other diseases of the respiratory system 07/01/2014     History of pharyngitis    Personal history of other specified conditions 03/11/2015    History of atypical nevus    Sciatica, right side 05/10/2019    Sciatica of right side    Stress fracture, left foot, initial encounter for fracture 07/06/2020    Metatarsal stress fracture of left foot    Vertigo Oct 2023    resolved    Vitamin D deficiency        Past Surgical History:   Procedure Laterality Date    BREAST BIOPSY      BREAST LUMPECTOMY      BREAST SURGERY  Feb 2018    KNEE SURGERY  04/09/2015    Knee Surgery    LYMPH NODE BIOPSY  Feb 2018    NOSE SURGERY  04/09/2015    Nose Surgery    OTHER SURGICAL HISTORY  04/07/2015    Prior Surgical Procedure Not Done    OTHER SURGICAL HISTORY  04/09/2015    Wrist Primary Excision Of Ganglion    SINUS SURGERY      WISDOM TOOTH EXTRACTION         Social History     Socioeconomic History    Marital status:      Spouse name: None    Number of children: None    Years of education: None    Highest education level: None   Occupational History    None   Tobacco Use    Smoking status: Never    Smokeless tobacco: Never   Substance and Sexual Activity    Alcohol use: Yes     Alcohol/week: 3.0 standard drinks of alcohol     Types: 3 Glasses of wine per week    Drug use: Never    Sexual activity: Yes     Partners: Male     Birth control/protection: Male Sterilization   Other Topics Concern    None   Social History Narrative    None     Social Determinants of Health     Financial Resource Strain: Not on file   Food Insecurity: Not on file   Transportation Needs: Not on file   Physical Activity: Not on file   Stress: Not on file   Social Connections: Not on file   Intimate Partner Violence: Not on file   Housing Stability: Not on file       No Known Allergies      Current Outpatient Medications:     calcium carbonate-vitamin D3 500 mg-3.125 mcg (125 unit) tablet tablet, Take 2 tablets by mouth once daily., Disp: , Rfl:     candesartan (Atacand) 8 mg tablet, Take 1 tablet (8 mg) by  mouth once daily., Disp: 90 tablet, Rfl: 1    meclizine (Antivert) 25 mg tablet, Take 1 tablet (25 mg) by mouth 3 times a day as needed for dizziness., Disp: 30 tablet, Rfl: 11     Objective    BSA: 1.48 meters squared  BP (!) 150/100 (BP Location: Left arm, Patient Position: Sitting)   Pulse 53   Temp 36.7 °C (98.1 °F) (Temporal)   Resp 14   Wt 50.2 kg (110 lb 10.7 oz)   SpO2 100%   BMI 20.37 kg/m²     ECOG Performance Status: 1    Physical Exam    GA: alert, oriented, cooperative  HEENT: anicteric sclera, well injected conjunctiva  Heart: RRR, no murmurs or gallops heard  Lung: CTAB  Abd: +BS, soft, non tender   Ext: peripheral pulses positive, warm extremities, no lower extremity edema  Breast: no new lumps or nodules in bilateral breasts      Laboratory Data:  Lab Results   Component Value Date    WBC 4.4 11/29/2023    HGB 13.3 11/29/2023    HCT 39.7 11/29/2023    MCV 91 11/29/2023     11/29/2023       Chemistry    Lab Results   Component Value Date/Time     11/29/2023 1110    K 4.1 11/29/2023 1110     11/29/2023 1110    CO2 28 11/29/2023 1110    BUN 11 11/29/2023 1110    CREATININE 0.83 11/29/2023 1110    Lab Results   Component Value Date/Time    CALCIUM 10.0 11/29/2023 1110    ALKPHOS 41 03/21/2022 1237    AST 17 03/21/2022 1237    ALT 14 03/21/2022 1237    BILITOT 0.5 03/21/2022 1237             Radiology:  ECG 12 Lead  Normal sinus rhythm  Normal ECG  When compared with ECG of 25-JAN-2018 12:22,  No significant change was found  Confirmed by Miguel Spencer ([id number]) on 11/11/2023 5:44:54 PM       BI MR BREAST BILATERAL WITH CONTRAST FAST SCREENING SELF PAY 02/09/2023    Narrative  MRN: [id number]  Patient Name: ELEUTERIO SILVESTRE    STUDY:  MRI BREAST BILATERAL WITH CONTRAST FAST SCREENING (SELF PAY);  2/9/2023 12:23 pm    ACCESSION NUMBER(S):  [id number]    ORDERING CLINICIAN:  BRIDGET DIAZ    INDICATION:  Dense breast tissue on mammography. Left lumpectomy with radiation  and posterior  location of the lumpectomy site. Benign right biopsy.    COMPARISON:  MRI 03/03/2022 with correlation to mammograms 09/08/2022.    TECHNIQUE:  Using a dedicated breast coil, STIR axial and T1-weighted fat  saturation axial images of the breasts were obtained, the latter both  before and after intravenous administration of Gadolinium DTPA.    Intravenous contrast:  10 milliliter of DOTAREM GADOTERATE MEGLUMINE  INJECTION    FINDINGS:  There is  symmetric  minimal bilateral background enhancement.    There is extreme fibroglandular tissue.    RIGHT BREAST: There is signal void from a tissue marker in the deep  12 position no suspicious mass or nonmass enhancement is identified.    No axillary or internal mammary lymphadenopathy is appreciated.    LEFT BREAST: There is postsurgical architectural distortion, skin  retraction and signal void from surgical clips in the deep central  superior breast from prior lumpectomy. No suspicious mass or nonmass  enhancement is identified.    There is axillary postoperative scarring. No axillary or internal  mammary lymphadenopathy is appreciated.    NON-BREAST FINDINGS:  None.    Impression  Left post treatment changes. No MRI evidence of malignancy in either  breast.    BI-RADS CATEGORY:    Category: 2 - Benign.  Recommendation: 1 Year Screening.          Assessment/Plan:    53 year old female who had in 2018 a  Left partial mastectomy and SLN 2/2/18 that  showed invasive lobular carcinoma 2.8 cm, 0/2 SLN, margins positive, Oncotype DX RS 15. Reexcision 2/28/18 negative other than LCIS s/p Left breast radiation with 42.56 Gy in 16 fractions followed by a boost of 12 Gy in 6 fractions completed 5/3/18. She started tamoxifen on 05/20/2018 and is on extended therapy.  Recently had a uterine polyp that was removed and was benign    I reviewed with her the events that led to her diagnosis of breast cancer. We reviewed all the procedures and diagnostic imaging she underwent thus far. I  discussed the features of her breast cancer that include the size, grade, lymph node status and  hormone receptor/ her2-ilia status.      - Last period more than 1 year ago and has intermittent hot flushes- if post menopausal will switch to AI , if plan to switch for  AI, she will  need bisphosphonates. Ordered LH, FSH and estradiol    - Does have osteopenia and is on Ca/vit D and does weight bearing exercises (follows up with Dr Gutierrez) -     - continue regula surveillance with  breast MRI alternating with mammogram    - No new concerning symptoms today    - c/w fup Ob-Gyn and PCP      At least 60 minutes of direct consultation was spent reviewing the patient's chart as well as discussing and  reviewing the  cancer care plan including educating and answering  questions and concerns, greater than 50 percent spent in counseling and coordination  of care.                   Orders Placed This Encounter   Procedures    FSH & LH     Standing Status:   Future     Standing Expiration Date:   12/6/2024     Order Specific Question:   Release result to [employer]     Answer:   Immediate [1]    Estradiol     Standing Status:   Future     Standing Expiration Date:   12/6/2024     Order Specific Question:   Release result to [id number]     Answer:   Immediate             Goldy Harry MD  Hematology and Medical Oncology  [facility]

## 2023-12-08 ENCOUNTER — LAB (OUTPATIENT)
Dept: LAB | Facility: LAB | Age: 53
End: 2023-12-08
Payer: COMMERCIAL

## 2023-12-08 DIAGNOSIS — C50.412 MALIGNANT NEOPLASM OF UPPER-OUTER QUADRANT OF LEFT FEMALE BREAST, UNSPECIFIED ESTROGEN RECEPTOR STATUS (MULTI): ICD-10-CM

## 2023-12-08 PROCEDURE — 82670 ASSAY OF TOTAL ESTRADIOL: CPT

## 2023-12-08 PROCEDURE — 83002 ASSAY OF GONADOTROPIN (LH): CPT

## 2023-12-08 PROCEDURE — 36415 COLL VENOUS BLD VENIPUNCTURE: CPT

## 2023-12-08 PROCEDURE — 83001 ASSAY OF GONADOTROPIN (FSH): CPT

## 2023-12-09 LAB
ESTRADIOL SERPL-MCNC: 36 PG/ML
FSH SERPL-ACNC: 43.8 IU/L
LH SERPL-ACNC: 24.3 IU/L

## 2023-12-13 ENCOUNTER — TELEPHONE (OUTPATIENT)
Dept: HEMATOLOGY/ONCOLOGY | Facility: CLINIC | Age: 53
End: 2023-12-13
Payer: COMMERCIAL

## 2023-12-13 RX ORDER — ANASTROZOLE 1 MG/1
1 TABLET ORAL DAILY
Qty: 90 TABLET | Refills: 1 | Status: SHIPPED | OUTPATIENT
Start: 2023-12-13 | End: 2024-04-10 | Stop reason: WASHOUT

## 2023-12-13 NOTE — TELEPHONE ENCOUNTER
Labs reviewed: FSH 43.8 so patient is post menopausal switched to anastrozole.   Would recommend bisphosphonates. Being managed by Dr Gutierrez

## 2024-01-03 ENCOUNTER — TELEPHONE (OUTPATIENT)
Dept: ENDOCRINOLOGY | Facility: HOSPITAL | Age: 54
End: 2024-01-03
Payer: COMMERCIAL

## 2024-01-03 DIAGNOSIS — M85.89 OSTEOPENIA OF MULTIPLE SITES: Primary | ICD-10-CM

## 2024-01-03 NOTE — TELEPHONE ENCOUNTER
Called patient and discussed with her that at this time there is no indication to treat.  We will recheck bone density in 6 months after aromatase inhibitor and if there is any worsening we will consider bisphosphonate.  We calculated FRAX risk was less than 3% for have been less than 20% for major osteoporotic fractures even though we included rheumatoid arthritis she is treated for aromatase inhibitor

## 2024-01-29 ENCOUNTER — OFFICE VISIT (OUTPATIENT)
Dept: OBSTETRICS AND GYNECOLOGY | Facility: CLINIC | Age: 54
End: 2024-01-29
Payer: COMMERCIAL

## 2024-01-29 VITALS
HEIGHT: 62 IN | BODY MASS INDEX: 20.86 KG/M2 | SYSTOLIC BLOOD PRESSURE: 118 MMHG | DIASTOLIC BLOOD PRESSURE: 84 MMHG | WEIGHT: 113.38 LBS

## 2024-01-29 DIAGNOSIS — Z01.419 WELL WOMAN EXAM WITH ROUTINE GYNECOLOGICAL EXAM: ICD-10-CM

## 2024-01-29 PROCEDURE — 1036F TOBACCO NON-USER: CPT | Performed by: OBSTETRICS & GYNECOLOGY

## 2024-01-29 PROCEDURE — 99396 PREV VISIT EST AGE 40-64: CPT | Performed by: OBSTETRICS & GYNECOLOGY

## 2024-01-29 PROCEDURE — 3074F SYST BP LT 130 MM HG: CPT | Performed by: OBSTETRICS & GYNECOLOGY

## 2024-01-29 PROCEDURE — 3079F DIAST BP 80-89 MM HG: CPT | Performed by: OBSTETRICS & GYNECOLOGY

## 2024-01-29 PROCEDURE — 87624 HPV HI-RISK TYP POOLED RSLT: CPT

## 2024-01-29 PROCEDURE — 88175 CYTOPATH C/V AUTO FLUID REDO: CPT

## 2024-01-29 NOTE — PROGRESS NOTES
"Patient presents for annual exam   Last PAP 2019 NEG HPV-  Last mammogram diagnostic 2023 NEG, MRI scheduled for next month    ANNUAL SUBJECTIVE    Patsyblane Mac is a 53 y.o. female who presents for annual exam today.  Her periods are absent.  She is postmenopausal, transitioned to Arimidex for breast cancer risk reduction.  No bleeding since her polyp resection 2023 by Dr. Shea.     PMH - ER/CT positive left breast breast cancer, HTN, osteopenia    PSH - lumpectomy    OB history -   No obstetric history on file.    Last pap -   Normal HPV Negative    Last mammogram - 2023, has MRI next month    Family history of breast or ovarian cancer - personal history of breast cancer    OBJECTIVE  /84 (BP Location: Left arm, Patient Position: Sitting, BP Cuff Size: Adult)   Ht 1.575 m (5' 2\")   Wt 51.4 kg (113 lb 6 oz)   LMP  (LMP Unknown)   Breastfeeding No   BMI 20.74 kg/m²     General Appearance   - consistent with stated age, well groomed and cooperative    Integumentary  - skin warm and dry without rash    Head and Neck  - normalocephalic and neck supple    Chest and Lung Exam  - normal breathing effort, no respiratory distress    Breast  - symmetry noted, no mass palpable, no skin change and no nipple discharge.    Abdomen  - soft, nontender and no hepatomegaly, splenomegaly, or mass    Female Genitourinary  - vulva normal without rash or lesion, normal vaginal rugae, no vaginal discharge, uterus normal size & no palpable masses, no adnexal mass, no adnexal tenderness, no cervical motion tenderness    Peripheral Vascular  - no edema present    ASSESSMENT/PLAN  53 y.o. yo  female who presents for annual exam.       Actions performed during this visit include:  - Clinical breast exam normal  - Clinical pelvic exam normal  - Pap: done today  - Mammogram up to date, has MRI next moth  - Contraception postmenopausal    Please return for your next visit in 1 year.    Christen Rayo, " MD

## 2024-02-02 ENCOUNTER — TELEPHONE (OUTPATIENT)
Dept: HEMATOLOGY/ONCOLOGY | Facility: HOSPITAL | Age: 54
End: 2024-02-02
Payer: COMMERCIAL

## 2024-02-02 NOTE — TELEPHONE ENCOUNTER
Called to check on patient as I see she is on my schedule on 2/5, but saw Dr. Harry in December. Was checking in to see if she still needed/wanted to keep follow up with me. Got her VM. Left message/office #.       Dr. Harry said she can see him back in April or May. I sent patient a [employer] message with this info. Including scheduling # for patient.

## 2024-02-03 DIAGNOSIS — I10 HYPERTENSION, UNSPECIFIED TYPE: ICD-10-CM

## 2024-02-05 ENCOUNTER — APPOINTMENT (OUTPATIENT)
Dept: HEMATOLOGY/ONCOLOGY | Facility: CLINIC | Age: 54
End: 2024-02-05
Payer: COMMERCIAL

## 2024-02-05 RX ORDER — CANDESARTAN 8 MG/1
8 TABLET ORAL DAILY
Qty: 90 TABLET | Refills: 1 | OUTPATIENT
Start: 2024-02-05

## 2024-02-11 PROBLEM — C50.412 MALIGNANT NEOPLASM OF UPPER-OUTER QUADRANT OF LEFT FEMALE BREAST, UNSPECIFIED ESTROGEN RECEPTOR STATUS (MULTI): Status: ACTIVE | Noted: 2023-05-18

## 2024-02-11 NOTE — PATIENT INSTRUCTIONS
Stop the sleep aid you are taking and return in one week for a blood pressure check.  At that time we will decide on an approach for your hot flashes.    Follow up fasting (no alcohol for 48 hours and just water for 14 hours) in six months for your next routine appointment.  In general, take any medications on schedule (except for types of Insulin).

## 2024-02-11 NOTE — PROGRESS NOTES
Subjective   Patient ID: [id number]     Patsy Mac is a 53 y.o. female who presents for Med Management.    HPI  Taking meds as directed without tolerability or affordability issues.  Has been taking 25 mg of diphenhydramine at night to help her sleep due to Arimidex causing intolerable to flashes.    Review of Systems  CARDIO- No chest pain or pressure, nausea, diaphoresis, paresthesias, dizziness, or syncope with or without exertion  GI-No blood in stool, tarry stools, pain, vomiting, heartburn, constipation or diarrhea  PULM-No wheezing, coughing or shortness of breath  UROL-No frequency, urgency, blood in urine, or incontinence  ENDO- No change in hair, voice, skin, weight  MSK-No locking, giving way/swelling of joints;  gets muscle cramps once monthly after running that can be avoided by stretching  NEURO- No daily headaches, hx of concussion, falls in the last year or seizure  DERM-No rashes, blanching or change in any moles    Objective     BP (!) 145/94 (BP Location: Right arm, Patient Position: Sitting)   Temp 36.9 °C (98.4 °F) (Oral)   Wt 50.8 kg (112 lb)   LMP  (LMP Unknown)   BMI 20.49 kg/m²      Physical Exam  Neck-supple without lymphadenopathy or thyromegaly; no carotid bruits  Throat- without erythema or exudate, uvula in midlineNeck-supple without lymphadenopathy or thyromegaly; no carotid bruits  Heart- regular rate and rhythm, normal s1 and s2 without murmur or gallop  Lungs-clear to auscultation  Abdomen-soft, positive bowel sounds, without masses, HSmegaly or pain     Assessment/Plan     Problem List Items Addressed This Visit       RESOLVED: Hip pain, right    Hot flashes    Hypertension - Primary    Relevant Medications    candesartan (Atacand) 8 mg tablet    Other Relevant Orders    Lipid panel    ALT    AST    Alkaline phosphatase    RESOLVED: Lateral epicondylitis of right elbow    RESOLVED: Left carpal tunnel syndrome    Muscle cramps    Osteopenia    Malignant neoplasm of  upper-outer quadrant of left female breast, unspecified estrogen receptor status (CMS/HCC)     Other Visit Diagnoses       Health care maintenance        Relevant Orders    Hepatitis C antibody        Follow up fasting (no alcohol for 48 hours and just water for 14 hours) in six months for your next routine appointment.  In general, take any medications on schedule (except for types of Insulin).      Raman Rivas MD

## 2024-02-12 ENCOUNTER — OFFICE VISIT (OUTPATIENT)
Dept: PRIMARY CARE | Facility: CLINIC | Age: 54
End: 2024-02-12
Payer: COMMERCIAL

## 2024-02-12 VITALS
BODY MASS INDEX: 20.49 KG/M2 | TEMPERATURE: 98.4 F | SYSTOLIC BLOOD PRESSURE: 145 MMHG | DIASTOLIC BLOOD PRESSURE: 94 MMHG | WEIGHT: 112 LBS

## 2024-02-12 DIAGNOSIS — I10 HYPERTENSION, UNSPECIFIED TYPE: Primary | ICD-10-CM

## 2024-02-12 DIAGNOSIS — Z00.00 HEALTH CARE MAINTENANCE: ICD-10-CM

## 2024-02-12 DIAGNOSIS — C50.412 MALIGNANT NEOPLASM OF UPPER-OUTER QUADRANT OF LEFT FEMALE BREAST, UNSPECIFIED ESTROGEN RECEPTOR STATUS (MULTI): ICD-10-CM

## 2024-02-12 DIAGNOSIS — M77.11 LATERAL EPICONDYLITIS OF RIGHT ELBOW: ICD-10-CM

## 2024-02-12 DIAGNOSIS — R25.2 MUSCLE CRAMPS: ICD-10-CM

## 2024-02-12 DIAGNOSIS — M85.89 OSTEOPENIA OF MULTIPLE SITES: ICD-10-CM

## 2024-02-12 DIAGNOSIS — G56.02 LEFT CARPAL TUNNEL SYNDROME: ICD-10-CM

## 2024-02-12 DIAGNOSIS — M25.551 HIP PAIN, RIGHT: ICD-10-CM

## 2024-02-12 DIAGNOSIS — R23.2 HOT FLASHES: ICD-10-CM

## 2024-02-12 PROCEDURE — 99214 OFFICE O/P EST MOD 30 MIN: CPT | Performed by: FAMILY MEDICINE

## 2024-02-12 PROCEDURE — 1036F TOBACCO NON-USER: CPT | Performed by: FAMILY MEDICINE

## 2024-02-12 PROCEDURE — 3080F DIAST BP >= 90 MM HG: CPT | Performed by: FAMILY MEDICINE

## 2024-02-12 PROCEDURE — 3077F SYST BP >= 140 MM HG: CPT | Performed by: FAMILY MEDICINE

## 2024-02-12 RX ORDER — CANDESARTAN 8 MG/1
8 TABLET ORAL DAILY
Qty: 90 TABLET | Refills: 1 | Status: SHIPPED | OUTPATIENT
Start: 2024-02-12 | End: 2024-08-10

## 2024-02-14 ENCOUNTER — HOSPITAL ENCOUNTER (OUTPATIENT)
Dept: RADIOLOGY | Facility: CLINIC | Age: 54
Discharge: HOME | End: 2024-02-14
Payer: COMMERCIAL

## 2024-02-14 DIAGNOSIS — C50.412 MALIGNANT NEOPLASM OF UPPER-OUTER QUADRANT OF LEFT FEMALE BREAST (MULTI): ICD-10-CM

## 2024-02-14 DIAGNOSIS — Z17.0 ESTROGEN RECEPTOR POSITIVE STATUS (ER+): ICD-10-CM

## 2024-02-14 DIAGNOSIS — R92.2 INCONCLUSIVE MAMMOGRAM: ICD-10-CM

## 2024-02-14 LAB
CYTOLOGY CMNT CVX/VAG CYTO-IMP: NORMAL
HPV HR 12 DNA GENITAL QL NAA+PROBE: NEGATIVE
HPV HR GENOTYPES PNL CVX NAA+PROBE: NEGATIVE
HPV16 DNA SPEC QL NAA+PROBE: NEGATIVE
HPV18 DNA SPEC QL NAA+PROBE: NEGATIVE
LAB AP HPV GENOTYPE QUESTION: YES
LAB AP HPV HR: NORMAL
LABORATORY COMMENT REPORT: NORMAL
PATH REPORT.TOTAL CANCER: NORMAL

## 2024-02-14 PROCEDURE — 2550000001 HC RX 255 CONTRASTS: Performed by: OBSTETRICS & GYNECOLOGY

## 2024-02-14 PROCEDURE — 6100000003 BI MR BREAST BILATERAL WITH CONTRAST FAST SCREENING SELF PAY

## 2024-02-14 PROCEDURE — A9575 INJ GADOTERATE MEGLUMI 0.1ML: HCPCS | Performed by: OBSTETRICS & GYNECOLOGY

## 2024-02-14 RX ORDER — GADOTERATE MEGLUMINE 376.9 MG/ML
10 INJECTION INTRAVENOUS
Status: COMPLETED | OUTPATIENT
Start: 2024-02-14 | End: 2024-02-14

## 2024-02-14 RX ADMIN — GADOTERATE MEGLUMINE 10 ML: 376.9 INJECTION INTRAVENOUS at 15:44

## 2024-02-16 ENCOUNTER — LAB (OUTPATIENT)
Dept: LAB | Facility: LAB | Age: 54
End: 2024-02-16
Payer: COMMERCIAL

## 2024-02-16 DIAGNOSIS — Z00.00 HEALTH CARE MAINTENANCE: ICD-10-CM

## 2024-02-16 DIAGNOSIS — I10 HYPERTENSION, UNSPECIFIED TYPE: ICD-10-CM

## 2024-02-16 LAB
ALP SERPL-CCNC: 58 U/L (ref 33–110)
ALT SERPL W P-5'-P-CCNC: 37 U/L (ref 7–45)
AST SERPL W P-5'-P-CCNC: 26 U/L (ref 9–39)
CHOLEST SERPL-MCNC: 158 MG/DL (ref 0–199)
CHOLESTEROL/HDL RATIO: 2.1
HCV AB SER QL: NONREACTIVE
HDLC SERPL-MCNC: 74 MG/DL
LDLC SERPL CALC-MCNC: 74 MG/DL
NON HDL CHOLESTEROL: 84 MG/DL (ref 0–149)
TRIGL SERPL-MCNC: 48 MG/DL (ref 0–149)
VLDL: 10 MG/DL (ref 0–40)

## 2024-02-16 PROCEDURE — 80061 LIPID PANEL: CPT

## 2024-02-16 PROCEDURE — 84460 ALANINE AMINO (ALT) (SGPT): CPT

## 2024-02-16 PROCEDURE — 86803 HEPATITIS C AB TEST: CPT

## 2024-02-16 PROCEDURE — 84075 ASSAY ALKALINE PHOSPHATASE: CPT

## 2024-02-16 PROCEDURE — 36415 COLL VENOUS BLD VENIPUNCTURE: CPT

## 2024-02-16 PROCEDURE — 84450 TRANSFERASE (AST) (SGOT): CPT

## 2024-02-19 ENCOUNTER — CLINICAL SUPPORT (OUTPATIENT)
Dept: PRIMARY CARE | Facility: CLINIC | Age: 54
End: 2024-02-19
Payer: COMMERCIAL

## 2024-02-19 ENCOUNTER — APPOINTMENT (OUTPATIENT)
Dept: PRIMARY CARE | Facility: CLINIC | Age: 54
End: 2024-02-19
Payer: COMMERCIAL

## 2024-02-19 VITALS — DIASTOLIC BLOOD PRESSURE: 85 MMHG | SYSTOLIC BLOOD PRESSURE: 133 MMHG

## 2024-02-19 DIAGNOSIS — I10 HYPERTENSION, UNSPECIFIED TYPE: ICD-10-CM

## 2024-02-20 ENCOUNTER — APPOINTMENT (OUTPATIENT)
Dept: PRIMARY CARE | Facility: CLINIC | Age: 54
End: 2024-02-20
Payer: COMMERCIAL

## 2024-03-14 ENCOUNTER — CLINICAL SUPPORT (OUTPATIENT)
Dept: PRIMARY CARE | Facility: CLINIC | Age: 54
End: 2024-03-14
Payer: COMMERCIAL

## 2024-03-14 DIAGNOSIS — Z23 NEED FOR TETANUS, DIPHTHERIA, AND ACELLULAR PERTUSSIS (TDAP) VACCINE: ICD-10-CM

## 2024-03-14 PROCEDURE — 90715 TDAP VACCINE 7 YRS/> IM: CPT | Performed by: NURSE PRACTITIONER

## 2024-03-14 PROCEDURE — 90471 IMMUNIZATION ADMIN: CPT | Performed by: NURSE PRACTITIONER

## 2024-04-10 ENCOUNTER — OFFICE VISIT (OUTPATIENT)
Dept: HEMATOLOGY/ONCOLOGY | Facility: CLINIC | Age: 54
End: 2024-04-10
Payer: COMMERCIAL

## 2024-04-10 VITALS
WEIGHT: 110.89 LBS | SYSTOLIC BLOOD PRESSURE: 155 MMHG | BODY MASS INDEX: 20.28 KG/M2 | RESPIRATION RATE: 16 BRPM | TEMPERATURE: 97.5 F | DIASTOLIC BLOOD PRESSURE: 90 MMHG | HEART RATE: 52 BPM | OXYGEN SATURATION: 98 %

## 2024-04-10 DIAGNOSIS — C50.412 MALIGNANT NEOPLASM OF UPPER-OUTER QUADRANT OF LEFT FEMALE BREAST, UNSPECIFIED ESTROGEN RECEPTOR STATUS (MULTI): Primary | ICD-10-CM

## 2024-04-10 PROCEDURE — 99214 OFFICE O/P EST MOD 30 MIN: CPT | Performed by: STUDENT IN AN ORGANIZED HEALTH CARE EDUCATION/TRAINING PROGRAM

## 2024-04-10 PROCEDURE — 3077F SYST BP >= 140 MM HG: CPT | Performed by: STUDENT IN AN ORGANIZED HEALTH CARE EDUCATION/TRAINING PROGRAM

## 2024-04-10 PROCEDURE — 1036F TOBACCO NON-USER: CPT | Performed by: STUDENT IN AN ORGANIZED HEALTH CARE EDUCATION/TRAINING PROGRAM

## 2024-04-10 PROCEDURE — 3080F DIAST BP >= 90 MM HG: CPT | Performed by: STUDENT IN AN ORGANIZED HEALTH CARE EDUCATION/TRAINING PROGRAM

## 2024-04-10 RX ORDER — EXEMESTANE 25 MG/1
25 TABLET ORAL DAILY
Qty: 90 TABLET | Refills: 3 | Status: SHIPPED | OUTPATIENT
Start: 2024-04-10 | End: 2025-04-10

## 2024-04-10 ASSESSMENT — PAIN SCALES - GENERAL: PAINLEVEL: 0-NO PAIN

## 2024-04-10 NOTE — PROGRESS NOTES
Patient ID: Patsy Mac is a 53 y.o. female.      Cancer History:          Breast         AJCC Edition: 8th (AJCC), Diagnosis Date: 02-Feb-2018, IA, pT2 pN0 cM0 G2        Treatment History:    1. Palpable left breast mass 12/2017, Imaging showed suspicious mass left breast.   2. Left breast core biopsy 12/15/17 showed invasive lobular carcinoma, grade 2, ER >95% IA 50% HER2 0. Right breast core biopsy for MRI findings benign 1/3/18.    3. Genetic testing is negative for BRCA and panel testing My Risk (VUS in BARD1 and CHEK2).  4. Left partial mastectomy and SLN 2/2/18 showed invasive lobular carcinoma 2.8 cm, 0/2 SLN, margins positive, Oncotype DX RS 15. Reexcision 2/28/18 negative other than LCIS.   5. Left breast radiation with 42.56 Gy in 16 fractions followed by a boost of 12 Gy in 6 fractions completed 5/3/18.  6. Started tamoxifen 5/20/18.      History of Present Illness (HPI)/Interval History:    Does have Insomnia (likely due to hot flushes), does have joint and muscle pain.    She denies any fevers or chills.    She denies any chest pain or breathing issues.     She denies any vision changes or headache issues, dizziness, loss of balance, neuropathy and no falls.     She reports a normal appetite and normal bowel movements. Her weight is stable.                 Review of Systems:  14-point ROS otherwise negative, as per HPI.    Past Medical History:   Diagnosis Date    Achilles tendinitis, left leg 11/30/2017    Achilles tendinitis of left lower extremity    Anaphylactic shock, unspecified, initial encounter 04/17/2015    Exercise induced anaphylaxis    Anemia     Cancer (CMS/HCC)     Chalazion left eye, unspecified eyelid 04/09/2015    Acute chalazion of left eye    Cough, unspecified 07/01/2014    Cough    Decreased white blood cell count, unspecified 05/21/2018    Decreased white blood cell count    Essential (primary) hypertension 10/10/2022    Hypertension    Follicular cyst of the skin and  subcutaneous tissue, unspecified 07/18/2019    Skin cyst    Hordeolum externum left eye, unspecified eyelid 05/12/2016    Hordeolum externum of left eye    Hordeolum externum left eye, unspecified eyelid 04/09/2015    Hordeolum externum of left eye    Hordeolum externum unspecified eye, unspecified eyelid 08/27/2015    Stye    Hordeolum externum unspecified eye, unspecified eyelid 08/27/2015    Stye    Hordeolum externum unspecified eye, unspecified eyelid 01/06/2019    Stye    Lesion of sciatic nerve, right lower limb 06/04/2021    Piriformis syndrome of right side    Melena 08/18/2015    Hematochezia    Other conditions influencing health status 08/25/2020    Colonoscopy planned    Other conditions influencing health status 02/19/2018    Seroma, postoperative    Other general symptoms and signs 10/18/2020    Flu-like symptoms    Other specified disorders of eye and adnexa 04/07/2015    Eye irritation    Pain in left ankle and joints of left foot     Left ankle pain    Pain in left wrist 03/06/2017    Left wrist pain    Pain in leg, unspecified 01/28/2019    Lower extremity pain    Pain in right finger(s) 01/21/2019    Pain of right thumb    Pain in unspecified shoulder 01/28/2019    Shoulder pain    Peroneal tendinitis, left leg 07/13/2020    Peroneal tendinitis of left lower extremity    Personal history of other diseases of the digestive system 09/01/2015    History of acute gastritis    Personal history of other diseases of the musculoskeletal system and connective tissue 05/10/2019    History of sacroiliac joint dysfunction    Personal history of other diseases of the musculoskeletal system and connective tissue 05/10/2019    History of low back pain    Personal history of other diseases of the respiratory system 01/06/2014    History of sinusitis    Personal history of other diseases of the respiratory system 07/01/2014    History of pharyngitis    Personal history of other specified conditions 03/11/2015     History of atypical nevus    Sciatica, right side 05/10/2019    Sciatica of right side    Stress fracture, left foot, initial encounter for fracture 07/06/2020    Metatarsal stress fracture of left foot    Vertigo Oct 2023    resolved    Vitamin D deficiency        Past Surgical History:   Procedure Laterality Date    BREAST BIOPSY      BREAST LUMPECTOMY      BREAST SURGERY  Feb 2018    KNEE SURGERY  04/09/2015    Knee Surgery    LYMPH NODE BIOPSY  Feb 2018    NOSE SURGERY  04/09/2015    Nose Surgery    OTHER SURGICAL HISTORY  04/07/2015    Prior Surgical Procedure Not Done    OTHER SURGICAL HISTORY  04/09/2015    Wrist Primary Excision Of Ganglion    SINUS SURGERY      WISDOM TOOTH EXTRACTION         Social History     Socioeconomic History    Marital status:      Spouse name: None    Number of children: None    Years of education: None    Highest education level: None   Occupational History    None   Tobacco Use    Smoking status: Never    Smokeless tobacco: Never   Vaping Use    Vaping status: Never Used   Substance and Sexual Activity    Alcohol use: Yes     Alcohol/week: 3.0 standard drinks of alcohol     Types: 3 Glasses of wine per week    Drug use: Never    Sexual activity: Yes     Partners: Male     Birth control/protection: Male Sterilization   Other Topics Concern    None   Social History Narrative    None     Social Determinants of Health     Financial Resource Strain: Not on file   Food Insecurity: Not on file   Transportation Needs: Not on file   Physical Activity: Not on file   Stress: Not on file   Social Connections: Not on file   Intimate Partner Violence: Not on file   Housing Stability: Not on file       No Known Allergies      Current Outpatient Medications:     anastrozole (Arimidex) 1 mg tablet, Take 1 tablet (1 mg total) by mouth once daily.  Swallow whole with a drink of water., Disp: 90 tablet, Rfl: 1    calcium carbonate-vitamin D3 500 mg-3.125 mcg (125 unit) tablet tablet, Take 2  tablets by mouth once daily., Disp: , Rfl:     candesartan (Atacand) 8 mg tablet, Take 1 tablet (8 mg) by mouth once daily., Disp: 90 tablet, Rfl: 1    meclizine (Antivert) 25 mg tablet, Take 1 tablet (25 mg) by mouth 3 times a day as needed for dizziness., Disp: 30 tablet, Rfl: 11     Objective    BSA: 1.48 meters squared  /90 (BP Location: Left arm, Patient Position: Sitting)   Pulse 52   Temp 36.4 °C (97.5 °F) (Temporal)   Resp 16   Wt 50.3 kg (110 lb 14.3 oz)   SpO2 98%   BMI 20.28 kg/m²     ECOG Performance Status: 1    Physical Exam    GA: alert, oriented, cooperative  HEENT: anicteric sclera, well injected conjunctiva  Heart: RRR, no murmurs or gallops heard  Lung: CTAB  Abd: +BS, soft, non tender   Ext: peripheral pulses positive, warm extremities, no lower extremity edema  Breast: no new lumps or nodules in bilateral breasts      Laboratory Data:  Lab Results   Component Value Date    WBC 4.4 11/29/2023    HGB 13.3 11/29/2023    HCT 39.7 11/29/2023    MCV 91 11/29/2023     11/29/2023       Chemistry    Lab Results   Component Value Date/Time     11/29/2023 1110    K 4.1 11/29/2023 1110     11/29/2023 1110    CO2 28 11/29/2023 1110    BUN 11 11/29/2023 1110    CREATININE 0.83 11/29/2023 1110    Lab Results   Component Value Date/Time    CALCIUM 10.0 11/29/2023 1110    ALKPHOS 58 02/16/2024 1329    AST 26 02/16/2024 1329    ALT 37 02/16/2024 1329    BILITOT 0.5 03/21/2022 1237             Radiology:  MR breast bilateral w IV contrast fast screening self pay  Narrative: Interpreted By:  Olivia Min,   STUDY:  BI MR BREAST BILATERAL WITH CONTRAST FAST SCREENING SELF PAY;  2/14/2024 3:43 pm      ACCESSION NUMBER(S):  [id number]      ORDERING CLINICIAN:  SRINATH JORGE      INDICATION:  Left lumpectomy with radiation in 2017. Benign right core biopsy in  2018. Dense breast tissue on mammography. Strong family history of  breast cancer.      COMPARISON:  Comparison to MRI  02/09/2023 with correlation to most recent  mammograms 09/14/2023.      TECHNIQUE:  Using a dedicated breast coil, STIR axial and T1-weighted fat  saturation axial images of the breasts were obtained, the latter both  before and after intravenous administration of Gadolinium DTPA.      Intravenous contrast:  10 mL of Dotarem      FINDINGS:  There is extreme fibroglandular tissue.      There is symmetric minimal bilateral background enhancement.      RIGHT BREAST: There is signal void from a tissue marker in the deep  12 o'clock position. No suspicious mass or nonmass enhancement is  identified.      No axillary or internal mammary lymphadenopathy is appreciated.      LEFT BREAST: There is postsurgical scarring, skin retraction and  signal void from surgical in the deep superomedial quadrant from  previous lumpectomy. No suspicious mass or nonmass enhancement is  identified.      There is axillary postoperative scarring. No axillary or internal  mammary lymphadenopathy is appreciated.      NON-BREAST FINDINGS:  None.      Impression: Benign left post treatment changes. No MRI evidence of malignancy in  either breast.      BI-RADS CATEGORY:      BI-RADS Category:  2 Benign.  Recommendation:  Annual Screening.  Recommended Date:  1 Year.  Laterality:  Bilateral.      For any future breast imaging appointments, please call [phone]  ([phone]).          MACRO:  None      Signed by: Olivia Min 2/15/2024 9:49 AM  Dictation workstation:   [id number]       BI MR BREAST BILATERAL WITH CONTRAST FAST SCREENING SELF PAY 02/09/2023    Narrative  MRN: [id number]  Patient Name: ELEUTERIO SILVESTRE    STUDY:  MRI BREAST BILATERAL WITH CONTRAST FAST SCREENING (SELF PAY);  2/9/2023 12:23 pm    ACCESSION NUMBER(S):  [id number]    ORDERING CLINICIAN:  BRIDGET DIAZ    INDICATION:  Dense breast tissue on mammography. Left lumpectomy with radiation  and posterior location of the lumpectomy site. Benign right biopsy.    COMPARISON:  MRI  03/03/2022 with correlation to mammograms 09/08/2022.    TECHNIQUE:  Using a dedicated breast coil, STIR axial and T1-weighted fat  saturation axial images of the breasts were obtained, the latter both  before and after intravenous administration of Gadolinium DTPA.    Intravenous contrast:  10 milliliter of DOTAREM GADOTERATE MEGLUMINE  INJECTION    FINDINGS:  There is  symmetric  minimal bilateral background enhancement.    There is extreme fibroglandular tissue.    RIGHT BREAST: There is signal void from a tissue marker in the deep  12 position no suspicious mass or nonmass enhancement is identified.    No axillary or internal mammary lymphadenopathy is appreciated.    LEFT BREAST: There is postsurgical architectural distortion, skin  retraction and signal void from surgical clips in the deep central  superior breast from prior lumpectomy. No suspicious mass or nonmass  enhancement is identified.    There is axillary postoperative scarring. No axillary or internal  mammary lymphadenopathy is appreciated.    NON-BREAST FINDINGS:  None.    Impression  Left post treatment changes. No MRI evidence of malignancy in either  breast.    BI-RADS CATEGORY:    Category: 2 - Benign.  Recommendation: 1 Year Screening.          Assessment/Plan:    53 year old female who had in 2018 a  Left partial mastectomy and SLN 2/2/18 that  showed invasive lobular carcinoma 2.8 cm, 0/2 SLN, margins positive, Oncotype DX RS 15. Reexcision 2/28/18 negative other than LCIS s/p Left breast radiation with 42.56 Gy in 16 fractions followed by a boost of 12 Gy in 6 fractions completed 5/3/18. She started tamoxifen on 05/20/2018 and is on extended therapy.  Recently had a uterine polyp that was removed and was benign    I reviewed with her the events that led to her diagnosis of breast cancer. We reviewed all the procedures and diagnostic imaging she underwent thus far. I discussed the features of her breast cancer that include the size, grade,  lymph node status and  hormone receptor/ her2-ilia status.      - Last period more than 1 year ago and has intermittent hot flushes- if post menopausal will switch to AI , if plan to switch for  AI, she will  need bisphosphonates. Ordered LH, FSH and estradiol. FSH: 44 c/w menopausal     - Does have osteopenia and is on Ca/vit D and does weight bearing exercises (follows up with Dr Gutierrez) - repeat bone density in June 2024.     - continue regular surveillance with  breast MRI alternating with mammogram    - Does have insomnia, hot flushes and joint pain will give 10 days break and switch to exemestane    - c/w fup Ob-Gyn and PCP    RTC in       At least 60 minutes of direct consultation was spent reviewing the patient's chart as well as discussing and  reviewing the  cancer care plan including educating and answering  questions and concerns, greater than 50 percent spent in counseling and coordination  of care.                       No orders of the defined types were placed in this encounter.            Goldy Harry MD  Hematology and Medical Oncology  [facility]

## 2024-06-24 ENCOUNTER — APPOINTMENT (OUTPATIENT)
Dept: RADIOLOGY | Facility: CLINIC | Age: 54
End: 2024-06-24
Payer: COMMERCIAL

## 2024-07-10 ENCOUNTER — APPOINTMENT (OUTPATIENT)
Dept: HEMATOLOGY/ONCOLOGY | Facility: CLINIC | Age: 54
End: 2024-07-10
Payer: COMMERCIAL

## 2024-07-16 ENCOUNTER — APPOINTMENT (OUTPATIENT)
Dept: HEMATOLOGY/ONCOLOGY | Facility: CLINIC | Age: 54
End: 2024-07-16
Payer: COMMERCIAL

## 2024-07-20 ASSESSMENT — LIFESTYLE VARIABLES
CURRENT_SMOKER: N
3_OR_MORE_DRINKS_PER_DAY: N

## 2024-07-21 DIAGNOSIS — I10 HYPERTENSION, UNSPECIFIED TYPE: ICD-10-CM

## 2024-07-22 ENCOUNTER — HOSPITAL ENCOUNTER (OUTPATIENT)
Dept: RADIOLOGY | Facility: HOSPITAL | Age: 54
Discharge: HOME | End: 2024-07-22
Payer: COMMERCIAL

## 2024-07-22 DIAGNOSIS — M85.89 OSTEOPENIA OF MULTIPLE SITES: ICD-10-CM

## 2024-07-22 PROCEDURE — 77085 DXA BONE DENSITY AXL VRT FX: CPT

## 2024-07-22 PROCEDURE — 77085 DXA BONE DENSITY AXL VRT FX: CPT | Performed by: RADIOLOGY

## 2024-07-22 RX ORDER — CANDESARTAN 8 MG/1
8 TABLET ORAL DAILY
Qty: 90 TABLET | Refills: 1 | OUTPATIENT
Start: 2024-07-22

## 2024-07-29 ENCOUNTER — TELEPHONE (OUTPATIENT)
Dept: PRIMARY CARE | Facility: HOSPITAL | Age: 54
End: 2024-07-29

## 2024-07-30 ENCOUNTER — TELEPHONE (OUTPATIENT)
Dept: HEMATOLOGY/ONCOLOGY | Facility: CLINIC | Age: 54
End: 2024-07-30
Payer: COMMERCIAL

## 2024-07-30 ENCOUNTER — APPOINTMENT (OUTPATIENT)
Dept: HEMATOLOGY/ONCOLOGY | Facility: CLINIC | Age: 54
End: 2024-07-30
Payer: COMMERCIAL

## 2024-07-30 NOTE — TELEPHONE ENCOUNTER
Contacted patient to follow up on cancelled appointment with Dr. Harry and to ask if patient wanted to reschedule, no answer at listed number

## 2024-08-20 ENCOUNTER — TELEPHONE (OUTPATIENT)
Dept: PRIMARY CARE | Facility: CLINIC | Age: 54
End: 2024-08-20
Payer: COMMERCIAL

## 2024-08-20 DIAGNOSIS — I10 HYPERTENSION, UNSPECIFIED TYPE: ICD-10-CM

## 2024-08-20 RX ORDER — CANDESARTAN 8 MG/1
8 TABLET ORAL DAILY
Qty: 90 TABLET | Refills: 1 | Status: SHIPPED | OUTPATIENT
Start: 2024-08-20 | End: 2025-02-16

## 2024-08-20 NOTE — TELEPHONE ENCOUNTER
REFILL REQUEST    Med: Candesartan   Med Dose: 8 mg  Med Frequency: one tab daily    Pharmacy: [facility]    LR: 02/12/2024  LV: 02/12/2024  NV: 09/13/2024

## 2024-09-13 ENCOUNTER — APPOINTMENT (OUTPATIENT)
Dept: PRIMARY CARE | Facility: CLINIC | Age: 54
End: 2024-09-13
Payer: COMMERCIAL

## 2024-10-17 PROBLEM — R42 VERTIGO: Status: RESOLVED | Noted: 2023-10-10 | Resolved: 2024-10-17

## 2024-10-17 PROBLEM — D50.9 IRON DEFICIENCY ANEMIA: Status: RESOLVED | Noted: 2023-05-18 | Resolved: 2024-10-17

## 2024-10-17 PROBLEM — Z00.00 HEALTH CARE MAINTENANCE: Status: ACTIVE | Noted: 2024-10-17

## 2024-10-17 PROBLEM — R93.89 ABNORMAL MRI: Status: RESOLVED | Noted: 2023-05-18 | Resolved: 2024-10-17

## 2024-10-18 ENCOUNTER — APPOINTMENT (OUTPATIENT)
Dept: PRIMARY CARE | Facility: CLINIC | Age: 54
End: 2024-10-18
Payer: COMMERCIAL

## 2024-10-18 VITALS
TEMPERATURE: 98.5 F | BODY MASS INDEX: 20.48 KG/M2 | SYSTOLIC BLOOD PRESSURE: 130 MMHG | DIASTOLIC BLOOD PRESSURE: 80 MMHG | WEIGHT: 111.99 LBS

## 2024-10-18 DIAGNOSIS — I10 HYPERTENSION, UNSPECIFIED TYPE: Primary | ICD-10-CM

## 2024-10-18 DIAGNOSIS — H81.13 BENIGN PAROXYSMAL POSITIONAL VERTIGO DUE TO BILATERAL VESTIBULAR DISORDER: ICD-10-CM

## 2024-10-18 DIAGNOSIS — J30.9 ALLERGIC RHINITIS, UNSPECIFIED SEASONALITY, UNSPECIFIED TRIGGER: ICD-10-CM

## 2024-10-18 DIAGNOSIS — Z00.00 HEALTH CARE MAINTENANCE: ICD-10-CM

## 2024-10-18 LAB
POC HDL CHOLESTEROL: 57 MG/DL (ref 0–50)
POC NON-HDL CHOLESTEROL: 91 MG/DL (ref 0–130)
POC TOTAL CHOLESTEROL/HDL RATIO: 2.6 (ref 0–4.5)
POC TOTAL CHOLESTEROL: 148 MG/DL (ref 0–199)

## 2024-10-18 PROCEDURE — 99214 OFFICE O/P EST MOD 30 MIN: CPT | Performed by: FAMILY MEDICINE

## 2024-10-18 PROCEDURE — 3075F SYST BP GE 130 - 139MM HG: CPT | Performed by: FAMILY MEDICINE

## 2024-10-18 PROCEDURE — 3079F DIAST BP 80-89 MM HG: CPT | Performed by: FAMILY MEDICINE

## 2024-10-18 PROCEDURE — 80061 LIPID PANEL: CPT | Performed by: FAMILY MEDICINE

## 2024-10-18 RX ORDER — CANDESARTAN 8 MG/1
8 TABLET ORAL DAILY
Qty: 90 TABLET | Refills: 1 | Status: SHIPPED | OUTPATIENT
Start: 2024-10-18 | End: 2025-04-16

## 2024-10-18 NOTE — PROGRESS NOTES
Subjective   Patient ID: [id number]     Patsy Mac is a 54 y.o. female who presents for Med Management.    HPI  Taking meds as directed without tolerability or affordability issues; no complaints today.    Review of Systems  GEN-denies, fever, weakness or myalgias; no unexplained fever or chills  OPTH-No dry eyes, itchy eyes, or blurry vision   ENT-No hearing loss, tinnitus or vertigo;  no vertigo since OCT2023  NECK-no stiffness, swelling or pain  PSYCH-No complaints regarding , appetite, memory or concentration;  no drug use or alcohol usage over six per week;  libido decreased since menopause and ok with   SLEEP-No complaints of insomnia, apnea or restless legs;  patient is waking up rested though she has hot flashes  ALL/IMMUN-No history of sneezing or itching  HEM-No unexplained bruising or bleeding    Objective     /80 (BP Location: Right arm, Patient Position: Sitting)   Temp 36.9 °C (98.5 °F) (Oral)   Wt 50.8 kg (111 lb 15.9 oz)   BMI 20.48 kg/m²      Physical Exam  General- well defined, well nourished, well hydrated individual in NAD  Skin- normal color and turgor; without nail pitting  Head-normocephalic without masses or tenderness  Eyes-pupils equal round, reactive to light and accommodation, fundi with normal cup/disc ratio, conjunctiva without redness or discharge  Ears-normal pinnae, and canals, with normal landmarks and light reflex of tympanic membranes bilaterally  Nose-septum in the midline, normal mucosa bilaterally  Throat- without erythema or exudate, uvula in midline  Neck-supple without lymphadenopathy or thyromegaly; no carotid bruits  Heart- regular rate and rhythm, normal s1 and s2 without murmur or gallop; peripheral pulses 2+ and symmetrical  Lungs-clear to auscultation  Abdomen-soft, positive bowel sounds, without masses, HSmegaly or pain     Assessment/Plan     Problem List Items Addressed This Visit       Allergic rhinitis    Hypertension - Primary    Relevant  Medications    candesartan (Atacand) 8 mg tablet    Other Relevant Orders    POCT Lipid Panel manually resulted    RESOLVED: Benign paroxysmal positional vertigo due to bilateral vestibular disorder    Health care maintenance    Relevant Orders    BI mammo bilateral screening tomosynthesis     You are eligible for the COVID booster.  Without a recent (within 90 days) challenge (booster or infection) your immune system will be three days behind in protecting you from the infection.  You will infect approximately five people by that time.    Follow up fasting (no alcohol for 48 hours and just water for 14 hours) in six months for your next routine appointment.  In general, take any medications on schedule (except for types of Insulin).      Raman Rivas MD

## 2024-10-18 NOTE — PATIENT INSTRUCTIONS
You are eligible for the COVID booster.  Without a recent (within 90 days) challenge (booster or infection) your immune system will be three days behind in protecting you from the infection.  You will infect approximately five people by that time.    Follow up fasting (no alcohol for 48 hours and just water for 14 hours) in six months for your next routine appointment.  In general, take any medications on schedule (except for types of Insulin).

## 2024-11-11 ENCOUNTER — HOSPITAL ENCOUNTER (OUTPATIENT)
Dept: RADIOLOGY | Facility: HOSPITAL | Age: 54
Discharge: HOME | End: 2024-11-11
Payer: COMMERCIAL

## 2024-11-11 DIAGNOSIS — Z00.00 HEALTH CARE MAINTENANCE: ICD-10-CM

## 2024-11-11 PROCEDURE — 77067 SCR MAMMO BI INCL CAD: CPT

## 2024-11-11 PROCEDURE — 77067 SCR MAMMO BI INCL CAD: CPT | Performed by: RADIOLOGY

## 2024-11-11 PROCEDURE — 77063 BREAST TOMOSYNTHESIS BI: CPT | Performed by: RADIOLOGY

## 2025-01-29 NOTE — PROGRESS NOTES
Patient ID: Patsy Mac is a 53 y.o. female.    Subjective    HPI      Objective    BSA: There is no height or weight on file to calculate BSA.  LMP  (LMP Unknown)      Physical Exam    Performance Status:  {ECOG performance status:91524}      Assessment/Plan       Cancer Staging   No matching staging information was found for the patient.    Oncology History    No history exists.        {Assess/PlanSmartLinks:17218}         Daphne Rivera, APRN-CNP             severe

## 2025-01-31 ENCOUNTER — APPOINTMENT (OUTPATIENT)
Dept: OBSTETRICS AND GYNECOLOGY | Facility: CLINIC | Age: 55
End: 2025-01-31
Payer: COMMERCIAL

## 2025-01-31 VITALS
SYSTOLIC BLOOD PRESSURE: 136 MMHG | WEIGHT: 112 LBS | HEIGHT: 62 IN | BODY MASS INDEX: 20.61 KG/M2 | DIASTOLIC BLOOD PRESSURE: 85 MMHG

## 2025-01-31 DIAGNOSIS — Z01.419 WELL WOMAN EXAM WITH ROUTINE GYNECOLOGICAL EXAM: Primary | ICD-10-CM

## 2025-01-31 PROCEDURE — 3079F DIAST BP 80-89 MM HG: CPT | Performed by: OBSTETRICS & GYNECOLOGY

## 2025-01-31 PROCEDURE — 3008F BODY MASS INDEX DOCD: CPT | Performed by: OBSTETRICS & GYNECOLOGY

## 2025-01-31 PROCEDURE — 99396 PREV VISIT EST AGE 40-64: CPT | Performed by: OBSTETRICS & GYNECOLOGY

## 2025-01-31 PROCEDURE — 3075F SYST BP GE 130 - 139MM HG: CPT | Performed by: OBSTETRICS & GYNECOLOGY

## 2025-01-31 ASSESSMENT — PATIENT HEALTH QUESTIONNAIRE - PHQ9
SUM OF ALL RESPONSES TO PHQ9 QUESTIONS 1 & 2: 0
1. LITTLE INTEREST OR PLEASURE IN DOING THINGS: NOT AT ALL
2. FEELING DOWN, DEPRESSED OR HOPELESS: NOT AT ALL

## 2025-02-01 NOTE — PROGRESS NOTES
"ANNUAL SUBJECTIVE    Patsy Mac is a 54 y.o. female who presents for annual exam today.  Her periods are absent.  She is postmenopausal,on Arimidex for breast cancer risk reduction.  No bleeding since her polyp resection 2023 by Dr. Shea.     PMH - ER/NH positive left breast breast cancer, HTN, osteopenia    PSH - lumpectomy    OB history -   # 1 - Date: None, Sex: None, Weight: None, GA: None, Type: None, Apgar1: None, Apgar5: None, Living: None, Birth Comments: None    # 2 - Date: None, Sex: None, Weight: None, GA: None, Type: None, Apgar1: None, Apgar5: None, Living: None, Birth Comments: None      Last pap -   Normal HPV Negative 2024    Last mammogram - 2024    Family history of breast or ovarian cancer - personal history of breast cancer    OBJECTIVE  /85 (BP Location: Right arm, Patient Position: Sitting, BP Cuff Size: Small adult)   Ht 1.575 m (5' 2\")   Wt 50.8 kg (112 lb)   LMP  (LMP Unknown)   BMI 20.49 kg/m²     General Appearance   - consistent with stated age, well groomed and cooperative    Integumentary  - skin warm and dry without rash    Head and Neck  - normalocephalic and neck supple    Chest and Lung Exam  - normal breathing effort, no respiratory distress    Breast  - symmetry noted, no mass palpable, no skin change and no nipple discharge.  - L lumpectomy scar noted    Abdomen  - soft, nontender and no hepatomegaly, splenomegaly, or mass    Female Genitourinary  - vulva normal without rash or lesion, normal vaginal rugae, no vaginal discharge, uterus normal size & no palpable masses, no adnexal mass, no adnexal tenderness, no cervical motion tenderness    Peripheral Vascular  - no edema present    ASSESSMENT/PLAN  54 y.o. yo  female who presents for annual exam.       Actions performed during this visit include:  - Clinical breast exam normal  - Clinical pelvic exam normal  - Pap up to date  - Mammogram up to date  - Contraception postmenopausal    Please " return for your next visit in 1 year.    Christen Rayo MD

## 2025-04-01 ENCOUNTER — OFFICE VISIT (OUTPATIENT)
Dept: HEMATOLOGY/ONCOLOGY | Facility: CLINIC | Age: 55
End: 2025-04-01
Payer: COMMERCIAL

## 2025-04-01 VITALS
OXYGEN SATURATION: 98 % | RESPIRATION RATE: 14 BRPM | TEMPERATURE: 98.1 F | DIASTOLIC BLOOD PRESSURE: 83 MMHG | HEART RATE: 58 BPM | BODY MASS INDEX: 20.5 KG/M2 | WEIGHT: 112.1 LBS | SYSTOLIC BLOOD PRESSURE: 158 MMHG

## 2025-04-01 DIAGNOSIS — C50.412 MALIGNANT NEOPLASM OF UPPER-OUTER QUADRANT OF LEFT FEMALE BREAST, UNSPECIFIED ESTROGEN RECEPTOR STATUS: Primary | ICD-10-CM

## 2025-04-01 PROCEDURE — 99214 OFFICE O/P EST MOD 30 MIN: CPT | Performed by: STUDENT IN AN ORGANIZED HEALTH CARE EDUCATION/TRAINING PROGRAM

## 2025-04-01 PROCEDURE — 3079F DIAST BP 80-89 MM HG: CPT | Performed by: STUDENT IN AN ORGANIZED HEALTH CARE EDUCATION/TRAINING PROGRAM

## 2025-04-01 PROCEDURE — 1036F TOBACCO NON-USER: CPT | Performed by: STUDENT IN AN ORGANIZED HEALTH CARE EDUCATION/TRAINING PROGRAM

## 2025-04-01 PROCEDURE — 3077F SYST BP >= 140 MM HG: CPT | Performed by: STUDENT IN AN ORGANIZED HEALTH CARE EDUCATION/TRAINING PROGRAM

## 2025-04-01 ASSESSMENT — PAIN SCALES - GENERAL: PAINLEVEL_OUTOF10: 0-NO PAIN

## 2025-04-01 NOTE — PROGRESS NOTES
Patient ID: Patsy Mac is a 54 y.o. female.      Cancer History:          Breast         AJCC Edition: 8th (AJCC), Diagnosis Date: 02-Feb-2018, IA, pT2 pN0 cM0 G2        Treatment History:    1. Palpable left breast mass 12/2017, Imaging showed suspicious mass left breast.   2. Left breast core biopsy 12/15/17 showed invasive lobular carcinoma, grade 2, ER >95% RI 50% HER2 0. Right breast core biopsy for MRI findings benign 1/3/18.    3. Genetic testing is negative for BRCA and panel testing My Risk (VUS in BARD1 and CHEK2).  4. Left partial mastectomy and SLN 2/2/18 showed invasive lobular carcinoma 2.8 cm, 0/2 SLN, margins positive, Oncotype DX RS 15. Reexcision 2/28/18 negative other than LCIS.   5. Left breast radiation with 42.56 Gy in 16 fractions followed by a boost of 12 Gy in 6 fractions completed 5/3/18.  6. Started tamoxifen 5/20/18.,   7. 11/2023 developed uterine polyp  8. LMP > 1 year ago, FSH: 40s: switched to exemestane in April 2024    History of Present Illness (HPI)/Interval History:    Is currently on exemestane since April 2024    She denies any fevers or chills.    She denies any chest pain or breathing issues.     She denies any vision changes or headache issues, dizziness, loss of balance, neuropathy and no falls.     She reports a normal appetite and normal bowel movements. Her weight is stable.         Review of Systems:  14-point ROS otherwise negative, as per HPI.    Past Medical History:   Diagnosis Date    Achilles tendinitis, left leg 11/30/2017    Achilles tendinitis of left lower extremity    Anaphylactic shock, unspecified, initial encounter 04/17/2015    Exercise induced anaphylaxis    Anemia     Cancer (Multi)     Chalazion left eye, unspecified eyelid 04/09/2015    Acute chalazion of left eye    Cough, unspecified 07/01/2014    Cough    Decreased white blood cell count, unspecified 05/21/2018    Decreased white blood cell count    Essential (primary) hypertension 10/10/2022     Hypertension    Follicular cyst of the skin and subcutaneous tissue, unspecified 07/18/2019    Skin cyst    Hordeolum externum left eye, unspecified eyelid 05/12/2016    Hordeolum externum of left eye    Hordeolum externum left eye, unspecified eyelid 04/09/2015    Hordeolum externum of left eye    Hordeolum externum unspecified eye, unspecified eyelid 08/27/2015    Stye    Hordeolum externum unspecified eye, unspecified eyelid 08/27/2015    Stye    Hordeolum externum unspecified eye, unspecified eyelid 01/06/2019    Stye    Lesion of sciatic nerve, right lower limb 06/04/2021    Piriformis syndrome of right side    Melena 08/18/2015    Hematochezia    Other conditions influencing health status 08/25/2020    Colonoscopy planned    Other conditions influencing health status 02/19/2018    Seroma, postoperative    Other general symptoms and signs 10/18/2020    Flu-like symptoms    Other specified disorders of eye and adnexa 04/07/2015    Eye irritation    Pain in left ankle and joints of left foot     Left ankle pain    Pain in left wrist 03/06/2017    Left wrist pain    Pain in leg, unspecified 01/28/2019    Lower extremity pain    Pain in right finger(s) 01/21/2019    Pain of right thumb    Pain in unspecified shoulder 01/28/2019    Shoulder pain    Peroneal tendinitis, left leg 07/13/2020    Peroneal tendinitis of left lower extremity    Personal history of other diseases of the digestive system 09/01/2015    History of acute gastritis    Personal history of other diseases of the musculoskeletal system and connective tissue 05/10/2019    History of sacroiliac joint dysfunction    Personal history of other diseases of the musculoskeletal system and connective tissue 05/10/2019    History of low back pain    Personal history of other diseases of the respiratory system 01/06/2014    History of sinusitis    Personal history of other diseases of the respiratory system 07/01/2014    History of pharyngitis    Personal  history of other specified conditions 03/11/2015    History of atypical nevus    Sciatica, right side 05/10/2019    Sciatica of right side    Stress fracture, left foot, initial encounter for fracture 07/06/2020    Metatarsal stress fracture of left foot    Vertigo Oct 2023    resolved    Vitamin D deficiency        Past Surgical History:   Procedure Laterality Date    BREAST BIOPSY      BREAST LUMPECTOMY      BREAST SURGERY  Feb 2018    KNEE SURGERY  04/09/2015    Knee Surgery    LYMPH NODE BIOPSY  Feb 2018    NOSE SURGERY  04/09/2015    Nose Surgery    OTHER SURGICAL HISTORY  04/07/2015    Prior Surgical Procedure Not Done    OTHER SURGICAL HISTORY  04/09/2015    Wrist Primary Excision Of Ganglion    SINUS SURGERY      WISDOM TOOTH EXTRACTION         Social History     Socioeconomic History    Marital status:    Tobacco Use    Smoking status: Never    Smokeless tobacco: Never   Vaping Use    Vaping status: Never Used   Substance and Sexual Activity    Alcohol use: Yes     Alcohol/week: 2.0 standard drinks of alcohol     Types: 2 Glasses of wine per week    Drug use: Never    Sexual activity: Yes     Partners: Male     Birth control/protection: Male Sterilization       No Known Allergies      Current Outpatient Medications:     calcium carbonate-vitamin D3 500 mg-3.125 mcg (125 unit) tablet tablet, Take 2 tablets by mouth once daily., Disp: , Rfl:     candesartan (Atacand) 8 mg tablet, Take 1 tablet (8 mg) by mouth once daily., Disp: 90 tablet, Rfl: 1    exemestane (Aromasin) 25 mg tablet, Take 1 tablet (25 mg total) by mouth once daily.  Take after a meal.  Try to take at the same time each day., Disp: 90 tablet, Rfl: 3     Objective    BSA: 1.49 meters squared  /83 (BP Location: Left arm, Patient Position: Sitting)   Pulse 58   Temp 36.7 °C (98.1 °F) (Temporal)   Resp 14   Wt 50.8 kg (112 lb 1.6 oz)   LMP  (LMP Unknown)   SpO2 98%   BMI 20.50 kg/m²     ECOG Performance Status: 1    Physical  Exam    GA: alert, oriented, cooperative  HEENT: anicteric sclera, well injected conjunctiva  Heart: RRR, no murmurs or gallops heard  Lung: CTAB  Abd: +BS, soft, non tender   Ext: peripheral pulses positive, warm extremities, no lower extremity edema  Breast: no new lumps or nodules in bilateral breasts      Laboratory Data:  Lab Results   Component Value Date    WBC 4.4 11/29/2023    HGB 13.3 11/29/2023    HCT 39.7 11/29/2023    MCV 91 11/29/2023     11/29/2023       Chemistry    Lab Results   Component Value Date/Time     11/29/2023 1110    K 4.1 11/29/2023 1110     11/29/2023 1110    CO2 28 11/29/2023 1110    BUN 11 11/29/2023 1110    CREATININE 0.83 11/29/2023 1110    Lab Results   Component Value Date/Time    CALCIUM 10.0 11/29/2023 1110    ALKPHOS 58 02/16/2024 1329    AST 26 02/16/2024 1329    ALT 37 02/16/2024 1329    BILITOT 0.5 03/21/2022 1237             Radiology:  BI mammo bilateral screening tomosynthesis  Narrative: Interpreted By:  Manjula Winslow,   STUDY:  BI MAMMO BILATERAL SCREENING TOMOSYNTHESIS;  11/11/2024 6:24 pm      ACCESSION NUMBER(S):  [id number]      ORDERING CLINICIAN:  MOLINA DENNEY      INDICATION:  Screening.      ,Z00.00 Encounter for general adult medical examination without  abnormal findings      COMPARISON:  All prior mammograms that are available at time of interpretation.      FINDINGS:  2D and tomosynthesis images were reviewed at 1 mm slice thickness.      Density:  The breasts are heterogeneously dense, which may obscure  small masses.      There is stable architectural distortion seen in the upper outer  aspect of the left breast. This is consistent with patient's history  of left lumpectomy. No suspicious masses or calcifications are  identified.      Impression: No mammographic evidence of malignancy.      BI-RADS CATEGORY:  BI-RADS Category:  2 Benign.  Recommendation:  Annual Screening.  Recommended Date:  1 Year.  Laterality:  Bilateral.               For any future breast imaging appointments, please call [phone]).          MACRO:  None      Signed by: Manjula Winslow 11/15/2024 8:08 AM  Dictation workstation:   [id number]       BI MR BREAST BILATERAL WITH CONTRAST FAST SCREENING SELF PAY 02/09/2023    Narrative  MRN: [id number]  Patient Name: ELEUTERIO SILVESTRE    STUDY:  MRI BREAST BILATERAL WITH CONTRAST FAST SCREENING (SELF PAY);  2/9/2023 12:23 pm    ACCESSION NUMBER(S):  [id number]    ORDERING CLINICIAN:  BRIDGET DIAZ    INDICATION:  Dense breast tissue on mammography. Left lumpectomy with radiation  and posterior location of the lumpectomy site. Benign right biopsy.    COMPARISON:  MRI 03/03/2022 with correlation to mammograms 09/08/2022.    TECHNIQUE:  Using a dedicated breast coil, STIR axial and T1-weighted fat  saturation axial images of the breasts were obtained, the latter both  before and after intravenous administration of Gadolinium DTPA.    Intravenous contrast:  10 milliliter of DOTAREM GADOTERATE MEGLUMINE  INJECTION    FINDINGS:  There is  symmetric  minimal bilateral background enhancement.    There is extreme fibroglandular tissue.    RIGHT BREAST: There is signal void from a tissue marker in the deep  12 position no suspicious mass or nonmass enhancement is identified.    No axillary or internal mammary lymphadenopathy is appreciated.    LEFT BREAST: There is postsurgical architectural distortion, skin  retraction and signal void from surgical clips in the deep central  superior breast from prior lumpectomy. No suspicious mass or nonmass  enhancement is identified.    There is axillary postoperative scarring. No axillary or internal  mammary lymphadenopathy is appreciated.    NON-BREAST FINDINGS:  None.    Impression  Left post treatment changes. No MRI evidence of malignancy in either  breast.    BI-RADS CATEGORY:    Category: 2 - Benign.  Recommendation: 1 Year Screening.          Assessment/Plan:    54 year old female who had in  2018 a  Left partial mastectomy and SLN 2/2/18 that  showed invasive lobular carcinoma 2.8 cm, 0/2 SLN, margins positive, Oncotype DX RS 15. Reexcision 2/28/18 negative other than LCIS s/p Left breast radiation with 42.56 Gy in 16 fractions followed by a boost of 12 Gy in 6 fractions completed 5/3/18. She started tamoxifen on 05/20/2018 and is on extended therapy.  Recently had a uterine polyp that was removed and was benign; FSH was done and in the 40s- she was switched to exemestane    I reviewed with her the events that led to her diagnosis of breast cancer. We reviewed all the procedures and diagnostic imaging she underwent thus far. I discussed the features of her breast cancer that include the size, grade, lymph node status and  hormone receptor/ her2-ilia status.        - Does have osteopenia and is on Ca/vit D and does weight bearing exercises (follows up with Dr Gutierrez) -   Will need follow up with Dr Gutierrez for bisphosphonates    - continue regular surveillance with  breast MRI alternating with mammogram- 11/2024:  Benign- ordered MRI breast to be done    - c/w fup Ob-Gyn and PCP    RTC in       At least 35  minutes of direct consultation was spent reviewing the patient's chart as well as discussing and  reviewing the  cancer care plan including educating and answering  questions and concerns, greater than 50 percent spent in counseling and coordination  of care.                       No orders of the defined types were placed in this encounter.            Goldy Harry MD  Hematology and Medical Oncology  [facility]    disc herniation, L4-5 left

## 2025-04-16 NOTE — PATIENT INSTRUCTIONS
You likely have a mild bursitis in your right knee.  Please ice it for 20 minutes after running and don't knee l on it for a couple weeks.  Call if the pain persists.      Follow up fasting (no alcohol for 48 hours and just water for 14 hours) in six months for your next routine appointment.  In general, take any medications on schedule (except for types of Insulin).

## 2025-04-16 NOTE — PROGRESS NOTES
Subjective   Patient ID: [id number]     Patsy Mac is a 54 y.o. female who presents for Med Management.    HPI  Taking meds as directed without tolerability or affordability issues.  6 weeks ago ngoc noted palpable pain in her anterior right lateral knee after walking more on an inclin on the treadmill. She is able to run without pain.    Review of Systems  CARDIO- No chest pain or pressure, nausea, diaphoresis, paresthesias, dizziness, or syncope with or without exertion  GI-No blood in stool, tarry stools, pain, vomiting, heartburn, constipation or diarrhea  PULM-No wheezing, coughing or shortness of breath  UROL-No frequency, urgency, blood in urine, or incontinence; nocturia not present;  uterine polyp removed and no more post menopausal bleeding.  ENDO- No change in hair, voice, skin, weight or temperature tolerance   MSK-No locking, giving way/swelling of joints;  mild evening muscle cramps intermittently  NEURO- No daily headaches, hx of concussion, fall or seizure in the last year   DERM-No rashes, blanching or change in any moles    Objective     /90 (BP Location: Right arm, Patient Position: Sitting)   Temp 36.8 °C (98.2 °F) (Oral)   Wt 50 kg (110 lb 3.7 oz)   LMP  (LMP Unknown)   BMI 20.16 kg/m²      Physical Exam  Neck-supple without lymphadenopathy or thyromegaly; no carotid bruits  Heart- regular rate and rhythm, normal s1 and s2 without murmur or gallop; no peripheral edema  Lungs-clear to auscultation  Abdomen-soft, positive bowel sounds, without masses, HSmegaly or pain   Right Knee exam- Full range of motion;  no varus or valgus deviation; negative anterior and posterior drawer sign;  negative Lachman's and Raysa's signs;  negative patellar trap test;  no pain    Assessment/Plan     Problem List Items Addressed This Visit       Colon polyps    Dense breast tissue    Hypertension - Primary    Relevant Medications    candesartan (Atacand) 8 mg tablet    Other Relevant Orders    Lipid  Panel    Comprehensive Metabolic Panel    Thyroid Stimulating Hormone    Muscle cramps    Osteopenia    Vitamin D insufficiency    Malignant neoplasm of upper-outer quadrant of left female breast, unspecified estrogen receptor status    RESOLVED: Postmenopausal bleeding    Acute pain of right knee    Relevant Orders    XR knee right 1-2 views     You likely have a mild bursitis in your right knee.  Please ice it for 20 minutes after running and don't knee l on it for a couple weeks.  Call if the pain persists.      Follow up fasting (no alcohol for 48 hours and just water for 14 hours) in six months for your next routine appointment.  In general, take any medications on schedule (except for types of Insulin).      Raman Rivas MD

## 2025-04-18 ENCOUNTER — APPOINTMENT (OUTPATIENT)
Dept: PRIMARY CARE | Facility: CLINIC | Age: 55
End: 2025-04-18
Payer: COMMERCIAL

## 2025-04-18 VITALS
DIASTOLIC BLOOD PRESSURE: 90 MMHG | WEIGHT: 110.23 LBS | TEMPERATURE: 98.2 F | BODY MASS INDEX: 20.16 KG/M2 | SYSTOLIC BLOOD PRESSURE: 138 MMHG

## 2025-04-18 DIAGNOSIS — K63.5 POLYP OF COLON, UNSPECIFIED PART OF COLON, UNSPECIFIED TYPE: ICD-10-CM

## 2025-04-18 DIAGNOSIS — R92.30 DENSE BREAST TISSUE: ICD-10-CM

## 2025-04-18 DIAGNOSIS — M85.89 OSTEOPENIA OF MULTIPLE SITES: ICD-10-CM

## 2025-04-18 DIAGNOSIS — E55.9 VITAMIN D INSUFFICIENCY: ICD-10-CM

## 2025-04-18 DIAGNOSIS — R25.2 MUSCLE CRAMPS: ICD-10-CM

## 2025-04-18 DIAGNOSIS — C50.412 MALIGNANT NEOPLASM OF UPPER-OUTER QUADRANT OF LEFT FEMALE BREAST, UNSPECIFIED ESTROGEN RECEPTOR STATUS: ICD-10-CM

## 2025-04-18 DIAGNOSIS — M25.561 ACUTE PAIN OF RIGHT KNEE: ICD-10-CM

## 2025-04-18 DIAGNOSIS — N95.0 POSTMENOPAUSAL BLEEDING: ICD-10-CM

## 2025-04-18 DIAGNOSIS — I10 HYPERTENSION, UNSPECIFIED TYPE: Primary | ICD-10-CM

## 2025-04-18 PROCEDURE — 90677 PCV20 VACCINE IM: CPT | Performed by: FAMILY MEDICINE

## 2025-04-18 PROCEDURE — 99214 OFFICE O/P EST MOD 30 MIN: CPT | Performed by: FAMILY MEDICINE

## 2025-04-18 PROCEDURE — 90471 IMMUNIZATION ADMIN: CPT | Performed by: FAMILY MEDICINE

## 2025-04-18 PROCEDURE — 3075F SYST BP GE 130 - 139MM HG: CPT | Performed by: FAMILY MEDICINE

## 2025-04-18 PROCEDURE — 3080F DIAST BP >= 90 MM HG: CPT | Performed by: FAMILY MEDICINE

## 2025-04-18 RX ORDER — CANDESARTAN 8 MG/1
8 TABLET ORAL DAILY
Qty: 90 TABLET | Refills: 1 | Status: SHIPPED | OUTPATIENT
Start: 2025-04-18 | End: 2025-10-15

## 2025-04-18 RX ORDER — EXEMESTANE 25 MG/1
25 TABLET ORAL DAILY
COMMUNITY

## 2025-04-19 LAB
ALBUMIN SERPL-MCNC: 5.1 G/DL (ref 3.6–5.1)
ALP SERPL-CCNC: 74 U/L (ref 37–153)
ALT SERPL-CCNC: 10 U/L (ref 6–29)
ANION GAP SERPL CALCULATED.4IONS-SCNC: 9 MMOL/L (CALC) (ref 7–17)
AST SERPL-CCNC: 14 U/L (ref 10–35)
BILIRUB SERPL-MCNC: 0.6 MG/DL (ref 0.2–1.2)
BUN SERPL-MCNC: 12 MG/DL (ref 7–25)
CALCIUM SERPL-MCNC: 10 MG/DL (ref 8.6–10.4)
CHLORIDE SERPL-SCNC: 101 MMOL/L (ref 98–110)
CHOLEST SERPL-MCNC: 165 MG/DL
CHOLEST/HDLC SERPL: 2.4 (CALC)
CO2 SERPL-SCNC: 30 MMOL/L (ref 20–32)
CREAT SERPL-MCNC: 0.83 MG/DL (ref 0.5–1.03)
EGFRCR SERPLBLD CKD-EPI 2021: 83 ML/MIN/1.73M2
GLUCOSE SERPL-MCNC: 83 MG/DL (ref 65–99)
HDLC SERPL-MCNC: 70 MG/DL
LDLC SERPL CALC-MCNC: 81 MG/DL (CALC)
NONHDLC SERPL-MCNC: 95 MG/DL (CALC)
POTASSIUM SERPL-SCNC: 4.5 MMOL/L (ref 3.5–5.3)
PROT SERPL-MCNC: 7.7 G/DL (ref 6.1–8.1)
SODIUM SERPL-SCNC: 140 MMOL/L (ref 135–146)
TRIGL SERPL-MCNC: 64 MG/DL
TSH SERPL-ACNC: 1.35 MIU/L (ref 0.4–4.5)

## 2025-04-22 DIAGNOSIS — C50.412 MALIGNANT NEOPLASM OF UPPER-OUTER QUADRANT OF LEFT FEMALE BREAST, UNSPECIFIED ESTROGEN RECEPTOR STATUS: Primary | ICD-10-CM

## 2025-04-22 RX ORDER — EXEMESTANE 25 MG/1
25 TABLET ORAL DAILY
Qty: 30 TABLET | Refills: 3 | Status: SHIPPED | OUTPATIENT
Start: 2025-04-22

## 2025-05-16 ENCOUNTER — APPOINTMENT (OUTPATIENT)
Dept: RADIOLOGY | Facility: CLINIC | Age: 55
End: 2025-05-16

## 2025-05-16 ENCOUNTER — HOSPITAL ENCOUNTER (OUTPATIENT)
Dept: RADIOLOGY | Facility: CLINIC | Age: 55
Discharge: HOME | End: 2025-05-16

## 2025-05-16 DIAGNOSIS — C50.412 MALIGNANT NEOPLASM OF UPPER-OUTER QUADRANT OF LEFT FEMALE BREAST, UNSPECIFIED ESTROGEN RECEPTOR STATUS: ICD-10-CM

## 2025-05-16 DIAGNOSIS — M25.561 ACUTE PAIN OF RIGHT KNEE: ICD-10-CM

## 2025-05-16 PROCEDURE — 2550000001 HC RX 255 CONTRASTS: Performed by: STUDENT IN AN ORGANIZED HEALTH CARE EDUCATION/TRAINING PROGRAM

## 2025-05-16 PROCEDURE — 73560 X-RAY EXAM OF KNEE 1 OR 2: CPT | Mod: RT

## 2025-05-16 PROCEDURE — A9575 INJ GADOTERATE MEGLUMI 0.1ML: HCPCS | Performed by: STUDENT IN AN ORGANIZED HEALTH CARE EDUCATION/TRAINING PROGRAM

## 2025-05-16 PROCEDURE — 6100000003 BI MR BREAST BILATERAL WITH CONTRAST FAST SCREENING SELF PAY

## 2025-05-16 RX ORDER — GADOTERATE MEGLUMINE 376.9 MG/ML
10 INJECTION INTRAVENOUS
Status: COMPLETED | OUTPATIENT
Start: 2025-05-16 | End: 2025-05-16

## 2025-05-16 RX ADMIN — GADOTERATE MEGLUMINE 10 ML: 376.9 INJECTION INTRAVENOUS at 14:30

## 2025-07-14 DIAGNOSIS — C50.412 MALIGNANT NEOPLASM OF UPPER-OUTER QUADRANT OF LEFT FEMALE BREAST, UNSPECIFIED ESTROGEN RECEPTOR STATUS: Primary | ICD-10-CM

## 2025-08-05 ENCOUNTER — APPOINTMENT (OUTPATIENT)
Dept: HEMATOLOGY/ONCOLOGY | Facility: CLINIC | Age: 55
End: 2025-08-05
Payer: COMMERCIAL

## 2025-08-12 ENCOUNTER — OFFICE VISIT (OUTPATIENT)
Dept: HEMATOLOGY/ONCOLOGY | Facility: CLINIC | Age: 55
End: 2025-08-12
Payer: COMMERCIAL

## 2025-08-12 VITALS
WEIGHT: 110.01 LBS | BODY MASS INDEX: 20.12 KG/M2 | HEART RATE: 59 BPM | OXYGEN SATURATION: 97 % | RESPIRATION RATE: 16 BRPM | DIASTOLIC BLOOD PRESSURE: 88 MMHG | SYSTOLIC BLOOD PRESSURE: 146 MMHG | TEMPERATURE: 97.3 F

## 2025-08-12 DIAGNOSIS — Z12.31 SCREENING MAMMOGRAM FOR BREAST CANCER: ICD-10-CM

## 2025-08-12 DIAGNOSIS — C50.412 MALIGNANT NEOPLASM OF UPPER-OUTER QUADRANT OF LEFT FEMALE BREAST, UNSPECIFIED ESTROGEN RECEPTOR STATUS: ICD-10-CM

## 2025-08-12 DIAGNOSIS — Z91.89 AT HIGH RISK FOR BREAST CANCER: Primary | ICD-10-CM

## 2025-08-12 PROCEDURE — 99214 OFFICE O/P EST MOD 30 MIN: CPT

## 2025-08-12 PROCEDURE — 3079F DIAST BP 80-89 MM HG: CPT

## 2025-08-12 PROCEDURE — 3077F SYST BP >= 140 MM HG: CPT

## 2025-08-12 ASSESSMENT — PAIN SCALES - GENERAL: PAINLEVEL_OUTOF10: 0-NO PAIN

## 2025-08-26 ENCOUNTER — PATIENT MESSAGE (OUTPATIENT)
Dept: HEMATOLOGY/ONCOLOGY | Facility: CLINIC | Age: 55
End: 2025-08-26
Payer: COMMERCIAL

## 2025-08-26 DIAGNOSIS — C50.412 MALIGNANT NEOPLASM OF UPPER-OUTER QUADRANT OF LEFT FEMALE BREAST, UNSPECIFIED ESTROGEN RECEPTOR STATUS: ICD-10-CM

## 2025-08-27 RX ORDER — EXEMESTANE 25 MG/1
25 TABLET ORAL DAILY
Qty: 90 TABLET | Refills: 3 | Status: SHIPPED | OUTPATIENT
Start: 2025-08-27

## 2025-10-17 ENCOUNTER — APPOINTMENT (OUTPATIENT)
Dept: PRIMARY CARE | Facility: CLINIC | Age: 55
End: 2025-10-17
Payer: COMMERCIAL

## 2025-11-20 ENCOUNTER — APPOINTMENT (OUTPATIENT)
Dept: ENDOCRINOLOGY | Facility: CLINIC | Age: 55
End: 2025-11-20
Payer: COMMERCIAL

## 2025-12-09 ENCOUNTER — APPOINTMENT (OUTPATIENT)
Dept: ENDOCRINOLOGY | Facility: CLINIC | Age: 55
End: 2025-12-09
Payer: COMMERCIAL

## 2026-01-27 ENCOUNTER — APPOINTMENT (OUTPATIENT)
Dept: ENDOCRINOLOGY | Facility: CLINIC | Age: 56
End: 2026-01-27
Payer: COMMERCIAL

## (undated) DEVICE — GLOVE, SURGICAL, PROTEXIS PI MICRO, 6.5, PF, LF

## (undated) DEVICE — ACCESSORY, FLUID MANAGEMENT, AVETA

## (undated) DEVICE — SOLUTION, IRRIGATION, SODIUM CHLORIDE 0.9%, 1000 ML, POUR BOTTLE

## (undated) DEVICE — Device

## (undated) DEVICE — BRIEF, PANTY, MESH, XL, 2PK

## (undated) DEVICE — ACCESSORY, AVETA, WASTE MANAGEMENT WITH CAP

## (undated) DEVICE — SOLUTION, IRRIGATION, STERILE WATER, 1000 ML, POUR BOTTLE

## (undated) DEVICE — DEVICE, RESECTING, AVETA FLEX, 2.9MM

## (undated) DEVICE — TOWEL PACK, STERILE, 4/PACK, BLUE

## (undated) DEVICE — HYSTEROSCOPE, AVETA CORAL, 4.6MM